# Patient Record
Sex: FEMALE | Race: WHITE | NOT HISPANIC OR LATINO | Employment: FULL TIME | URBAN - METROPOLITAN AREA
[De-identification: names, ages, dates, MRNs, and addresses within clinical notes are randomized per-mention and may not be internally consistent; named-entity substitution may affect disease eponyms.]

---

## 2022-09-15 ENCOUNTER — LAB REQUISITION (OUTPATIENT)
Dept: LAB | Facility: CLINIC | Age: 32
End: 2022-09-15

## 2022-09-15 PROCEDURE — 86481 TB AG RESPONSE T-CELL SUSP: CPT | Performed by: INTERNAL MEDICINE

## 2022-09-17 LAB
QUANTIFERON MITOGEN: 10 IU/ML
QUANTIFERON NIL TUBE: 0.11 IU/ML
QUANTIFERON TB1 TUBE: 0.15 IU/ML
QUANTIFERON TB2 TUBE: 0.15

## 2022-09-18 LAB
GAMMA INTERFERON BACKGROUND BLD IA-ACNC: 0.11 IU/ML
M TB IFN-G BLD-IMP: NEGATIVE
M TB IFN-G CD4+ BCKGRND COR BLD-ACNC: 9.89 IU/ML
MITOGEN IGNF BCKGRD COR BLD-ACNC: 0.04 IU/ML
MITOGEN IGNF BCKGRD COR BLD-ACNC: 0.04 IU/ML

## 2023-02-12 ENCOUNTER — HEALTH MAINTENANCE LETTER (OUTPATIENT)
Age: 33
End: 2023-02-12

## 2023-02-28 ENCOUNTER — PRENATAL OFFICE VISIT (OUTPATIENT)
Dept: NURSING | Facility: CLINIC | Age: 33
End: 2023-02-28
Payer: COMMERCIAL

## 2023-02-28 VITALS — HEIGHT: 68 IN | WEIGHT: 174 LBS | BODY MASS INDEX: 26.37 KG/M2

## 2023-02-28 DIAGNOSIS — Z13.79 GENETIC SCREENING: ICD-10-CM

## 2023-02-28 DIAGNOSIS — F41.9 ANXIETY: Primary | ICD-10-CM

## 2023-02-28 DIAGNOSIS — O36.80X0 PREGNANCY WITH INCONCLUSIVE FETAL VIABILITY: ICD-10-CM

## 2023-02-28 DIAGNOSIS — Z34.01 ENCOUNTER FOR SUPERVISION OF NORMAL FIRST PREGNANCY IN FIRST TRIMESTER: ICD-10-CM

## 2023-02-28 PROBLEM — Z34.00 SUPERVISION OF NORMAL FIRST PREGNANCY: Status: ACTIVE | Noted: 2023-02-28

## 2023-02-28 PROCEDURE — 99207 PR NO CHARGE NURSE ONLY: CPT

## 2023-02-28 RX ORDER — FAMOTIDINE 20 MG
1000 TABLET ORAL
COMMUNITY

## 2023-02-28 RX ORDER — FEXOFENADINE HCL 180 MG/1
180 TABLET ORAL DAILY
COMMUNITY
End: 2023-10-26

## 2023-02-28 RX ORDER — CHLORAL HYDRATE 500 MG
2 CAPSULE ORAL DAILY
COMMUNITY
End: 2023-10-26

## 2023-02-28 NOTE — PROGRESS NOTES
Abhinav has herpres last serology was neg  Maybe pre testing   Abhinav has a 4 year old has had febrile seizures known arachnoid cyst  Sees neuro no active dx of epilepsy                      SUBJECTIVE:     HPI:    This is a 32 year old female patient,  who presents for her first obstetrical visit.    JUDY: 10/9/2023, by Last Menstrual Period.  She is 8w1d weeks.  Her cycles are regular.  Her last menstrual period was normal.   Since her LMP, she has experienced  nausea and fatigue.    Additional History: First pregnancy for Meredith, Abhinav has a 3yo son. 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 He is not on daily suppression.  Discussed if Meredith is positive prophylactic treatment at 36 wks.  Abhinav's son has a known Arachnoid cyst and has had febrile seizures, no diagnosis of epilepsy at this time. His son's birth mother has Marfan syndrome.     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      She has a history of  first pregnancy    Since her last LMP she denies use of alcohol, tobacco and street drugs.    Past medical, surgical, social and family history were reviewed and updated in [facility].      Current Outpatient Medications   Medication     fexofenadine (ALLEGRA) 180 MG tablet     fish oil-omega-3 fatty acids 1000 MG capsule     Prenat Vit-FePoly-Methylfol-FA (PNV-IRON PO)     vitamin B complex with vitamin C (VITAMIN  B COMPLEX) tablet     Vitamin D, Cholecalciferol, 25 MCG (1000 UT) CAPS     No current facility-administered medications for this visit.       ROS:   12  point review of systems negative other than symptoms noted below or in the HPI.  Constitutional: Fatigue  Gastrointestinal: Nausea    Nurse phone visit completed. Prenatal book and folder (containing standard educational hand-outs and brochures will be given at the next visit to patient. Information in folder reviewed over the phone. Questions answered. Information given on optional screening available to assess chromosomal anomalies. Pt desires NIPS. Pt advised to call the clinic if she has any questions or concerns related to her pregnancy. Prenatal labs future ordered.   Dulce Maria Johnson RN on 2/28/2023 at 3:09 PM        No results found for: PAP  PHQ-9 score:    PHQ 2/24/2023   PHQ-9 Total Score 4   Q9: Thoughts of better off dead/self-harm past 2 weeks Not at all               NARGIS-7 SCORE 2/24/2023   Total Score 3 (minimal anxiety)   Total Score 3             Patient supplied answers from flow sheet for:  Prenatal OB Questionnaire.  Past Medical History  Have you ever recieved care for your mental health? : (!) Yes  Have you ever been in a major accident or suffered serious trauma?: No  Within the last year, has anyone hit, slapped, kicked or otherwise hurt you?: No  In the last year, has anyone forced you to have sex when you didn't want to?: No    Past Medical History 2   Have you ever received a blood transfusion?: No  Would you accept a blood transfusion if was medically recommended?: Yes  Does anyone in your home smoke?: No   Is your blood type Rh negative?: (!) Yes  Have you ever ?: No  Have you been hospitalized for a nonsurgical reason excluding normal delivery?: No  Have you ever had an abnormal pap smear?: No    Past Medical History (Continued)  Do you have a history of abnormalities of the uterus?: No  Did your mother take CLAYTON or any other hormones when she was pregnant with you?: No  Do you have any other problems we have not asked about which you feel may be important to this pregnancy?:  "No                   OBJECTIVE:     EXAM:  Ht 1.727 m (5' 8\")   Wt 78.9 kg (174 lb)   LMP 01/02/2023   BMI 26.46 kg/m   Body mass index is 26.46 kg/m .      "

## 2023-03-06 ASSESSMENT — ANXIETY QUESTIONNAIRES
8. IF YOU CHECKED OFF ANY PROBLEMS, HOW DIFFICULT HAVE THESE MADE IT FOR YOU TO DO YOUR WORK, TAKE CARE OF THINGS AT HOME, OR GET ALONG WITH OTHER PEOPLE?: NOT DIFFICULT AT ALL
1. FEELING NERVOUS, ANXIOUS, OR ON EDGE: NOT AT ALL
IF YOU CHECKED OFF ANY PROBLEMS ON THIS QUESTIONNAIRE, HOW DIFFICULT HAVE THESE PROBLEMS MADE IT FOR YOU TO DO YOUR WORK, TAKE CARE OF THINGS AT HOME, OR GET ALONG WITH OTHER PEOPLE: NOT DIFFICULT AT ALL
GAD7 TOTAL SCORE: 3
2. NOT BEING ABLE TO STOP OR CONTROL WORRYING: NOT AT ALL
6. BECOMING EASILY ANNOYED OR IRRITABLE: MORE THAN HALF THE DAYS
GAD7 TOTAL SCORE: 3
GAD7 TOTAL SCORE: 3
7. FEELING AFRAID AS IF SOMETHING AWFUL MIGHT HAPPEN: NOT AT ALL
7. FEELING AFRAID AS IF SOMETHING AWFUL MIGHT HAPPEN: NOT AT ALL
3. WORRYING TOO MUCH ABOUT DIFFERENT THINGS: NOT AT ALL
5. BEING SO RESTLESS THAT IT IS HARD TO SIT STILL: NOT AT ALL
4. TROUBLE RELAXING: SEVERAL DAYS

## 2023-03-06 ASSESSMENT — PATIENT HEALTH QUESTIONNAIRE - PHQ9
10. IF YOU CHECKED OFF ANY PROBLEMS, HOW DIFFICULT HAVE THESE PROBLEMS MADE IT FOR YOU TO DO YOUR WORK, TAKE CARE OF THINGS AT HOME, OR GET ALONG WITH OTHER PEOPLE: NOT DIFFICULT AT ALL
SUM OF ALL RESPONSES TO PHQ QUESTIONS 1-9: 5
SUM OF ALL RESPONSES TO PHQ QUESTIONS 1-9: 5

## 2023-03-12 LAB
ABO/RH(D): NORMAL
ANTIBODY SCREEN: NEGATIVE
SPECIMEN EXPIRATION DATE: NORMAL

## 2023-03-13 ENCOUNTER — PRENATAL OFFICE VISIT (OUTPATIENT)
Dept: OBGYN | Facility: CLINIC | Age: 33
End: 2023-03-13
Payer: COMMERCIAL

## 2023-03-13 ENCOUNTER — ANCILLARY PROCEDURE (OUTPATIENT)
Dept: ULTRASOUND IMAGING | Facility: CLINIC | Age: 33
End: 2023-03-13
Payer: COMMERCIAL

## 2023-03-13 VITALS — DIASTOLIC BLOOD PRESSURE: 60 MMHG | BODY MASS INDEX: 27.06 KG/M2 | WEIGHT: 178 LBS | SYSTOLIC BLOOD PRESSURE: 92 MMHG

## 2023-03-13 DIAGNOSIS — O36.80X0 PREGNANCY WITH INCONCLUSIVE FETAL VIABILITY: ICD-10-CM

## 2023-03-13 DIAGNOSIS — Z34.01 ENCOUNTER FOR SUPERVISION OF NORMAL FIRST PREGNANCY IN FIRST TRIMESTER: Primary | ICD-10-CM

## 2023-03-13 DIAGNOSIS — Z13.79 GENETIC SCREENING: ICD-10-CM

## 2023-03-13 LAB
ALBUMIN UR-MCNC: NEGATIVE MG/DL
APPEARANCE UR: CLEAR
BILIRUB UR QL STRIP: NEGATIVE
COLOR UR AUTO: YELLOW
ERYTHROCYTE [DISTWIDTH] IN BLOOD BY AUTOMATED COUNT: 13.2 % (ref 10–15)
GLUCOSE UR STRIP-MCNC: NEGATIVE MG/DL
HCT VFR BLD AUTO: 34.4 % (ref 35–47)
HGB BLD-MCNC: 11.5 G/DL (ref 11.7–15.7)
HGB UR QL STRIP: NEGATIVE
KETONES UR STRIP-MCNC: NEGATIVE MG/DL
LEUKOCYTE ESTERASE UR QL STRIP: NEGATIVE
MCH RBC QN AUTO: 30.7 PG (ref 26.5–33)
MCHC RBC AUTO-ENTMCNC: 33.4 G/DL (ref 31.5–36.5)
MCV RBC AUTO: 92 FL (ref 78–100)
NITRATE UR QL: NEGATIVE
PH UR STRIP: 6 [PH] (ref 5–7)
PLATELET # BLD AUTO: 320 10E3/UL (ref 150–450)
RBC # BLD AUTO: 3.75 10E6/UL (ref 3.8–5.2)
SP GR UR STRIP: 1.02 (ref 1–1.03)
UROBILINOGEN UR STRIP-ACNC: 0.2 E.U./DL
WBC # BLD AUTO: 7.8 10E3/UL (ref 4–11)

## 2023-03-13 PROCEDURE — 86762 RUBELLA ANTIBODY: CPT | Performed by: STUDENT IN AN ORGANIZED HEALTH CARE EDUCATION/TRAINING PROGRAM

## 2023-03-13 PROCEDURE — 86901 BLOOD TYPING SEROLOGIC RH(D): CPT | Performed by: STUDENT IN AN ORGANIZED HEALTH CARE EDUCATION/TRAINING PROGRAM

## 2023-03-13 PROCEDURE — 87086 URINE CULTURE/COLONY COUNT: CPT | Performed by: STUDENT IN AN ORGANIZED HEALTH CARE EDUCATION/TRAINING PROGRAM

## 2023-03-13 PROCEDURE — 36415 COLL VENOUS BLD VENIPUNCTURE: CPT | Performed by: STUDENT IN AN ORGANIZED HEALTH CARE EDUCATION/TRAINING PROGRAM

## 2023-03-13 PROCEDURE — 99207 PR FIRST OB VISIT: CPT | Performed by: STUDENT IN AN ORGANIZED HEALTH CARE EDUCATION/TRAINING PROGRAM

## 2023-03-13 PROCEDURE — 87389 HIV-1 AG W/HIV-1&-2 AB AG IA: CPT | Performed by: STUDENT IN AN ORGANIZED HEALTH CARE EDUCATION/TRAINING PROGRAM

## 2023-03-13 PROCEDURE — 86780 TREPONEMA PALLIDUM: CPT | Performed by: STUDENT IN AN ORGANIZED HEALTH CARE EDUCATION/TRAINING PROGRAM

## 2023-03-13 PROCEDURE — 85027 COMPLETE CBC AUTOMATED: CPT | Performed by: STUDENT IN AN ORGANIZED HEALTH CARE EDUCATION/TRAINING PROGRAM

## 2023-03-13 PROCEDURE — 86803 HEPATITIS C AB TEST: CPT | Performed by: STUDENT IN AN ORGANIZED HEALTH CARE EDUCATION/TRAINING PROGRAM

## 2023-03-13 PROCEDURE — 81003 URINALYSIS AUTO W/O SCOPE: CPT | Performed by: STUDENT IN AN ORGANIZED HEALTH CARE EDUCATION/TRAINING PROGRAM

## 2023-03-13 PROCEDURE — 86850 RBC ANTIBODY SCREEN: CPT | Performed by: STUDENT IN AN ORGANIZED HEALTH CARE EDUCATION/TRAINING PROGRAM

## 2023-03-13 PROCEDURE — 76817 TRANSVAGINAL US OBSTETRIC: CPT | Performed by: STUDENT IN AN ORGANIZED HEALTH CARE EDUCATION/TRAINING PROGRAM

## 2023-03-13 PROCEDURE — 87340 HEPATITIS B SURFACE AG IA: CPT | Performed by: STUDENT IN AN ORGANIZED HEALTH CARE EDUCATION/TRAINING PROGRAM

## 2023-03-13 PROCEDURE — 86900 BLOOD TYPING SEROLOGIC ABO: CPT | Performed by: STUDENT IN AN ORGANIZED HEALTH CARE EDUCATION/TRAINING PROGRAM

## 2023-03-13 NOTE — PROGRESS NOTES
[facility]  OB/GYN Clinic Note    SUBJECTIVE:      HPI:   This is a 32 year old female patient,  who presents for her first obstetrical visit.  JUDY: 10/9/2023, by Last Menstrual Period.  She is 10w0d.  Her cycles are regular.  Her last menstrual period was normal.   Since her LMP, she has experienced  nausea and fatigue.     Additional History:   -First pregnancy for Meredith  -Abhinav has a 3yo son. 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 He is not on daily suppression. Discussed if Meredith is positive prophylactic treatment at 36 wks.  -Abhinav's son has a known Arachnoid cyst and has had febrile seizures, no diagnosis of epilepsy at this time. His son's birth mother has Marfan syndrome.   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       She has a history of  first pregnancy     Since her last LMP she denies use of alcohol, tobacco and street drugs.     Past medical, surgical, social and family history were reviewed and updated in [facility].            Current Outpatient Medications   Medication     fexofenadine (ALLEGRA) 180 MG tablet     fish oil-omega-3 fatty acids 1000 MG capsule     Prenat Vit-FePoly-Methylfol-FA (PNV-IRON PO)     vitamin B complex with vitamin C (VITAMIN  B COMPLEX) tablet     Vitamin D, Cholecalciferol, 25 MCG (1000 UT) CAPS      No current facility-administered medications for this visit.         ROS:   12 point review of systems negative other than symptoms noted below or in the  HPI.  Constitutional: Fatigue  Gastrointestinal: Nausea     Nurse phone visit completed. Prenatal book and folder (containing standard educational hand-outs and brochures will be given at the next visit to patient. Information in folder reviewed over the phone. Questions answered. Information given on optional screening available to assess chromosomal anomalies. Pt desires NIPS. Pt advised to call the clinic if she has any questions or concerns related to her pregnancy. Prenatal labs future ordered.   Dulce Maria Johnson RN on 2/28/2023 at 3:09 PM           No results found for: PAP  PHQ-9 score:    PHQ 2/24/2023   PHQ-9 Total Score 4   Q9: Thoughts of better off dead/self-harm past 2 weeks Not at all          NARGIS-7 SCORE 2/24/2023   Total Score 3 (minimal anxiety)   Total Score 3        Answers for HPI/ROS submitted by the patient on 3/6/2023  If you checked off any problems, how difficult have these problems made it for you to do your work, take care of things at home, or get along with other people?: Not difficult at all  PHQ9 TOTAL SCORE: 5  NARGIS 7 TOTAL SCORE: 3     Patient supplied answers from flow sheet for:  Prenatal OB Questionnaire.  Past Medical History  Have you ever recieved care for your mental health? : (!) Yes  Have you ever been in a major accident or suffered serious trauma?: No  Within the last year, has anyone hit, slapped, kicked or otherwise hurt you?: No  In the last year, has anyone forced you to have sex when you didn't want to?: No     Past Medical History 2   Have you ever received a blood transfusion?: No  Would you accept a blood transfusion if was medically recommended?: Yes  Does anyone in your home smoke?: No   Is your blood type Rh negative?: (!) Yes  Have you ever ?: No  Have you been hospitalized for a nonsurgical reason excluding normal delivery?: No  Have you ever had an abnormal pap smear?: No     Past Medical History (Continued)  Do you have a history of abnormalities of the  uterus?: No  Did your mother take CLAYTON or any other hormones when she was pregnant with you?: No  Do you have any other problems we have not asked about which you feel may be important to this pregnancy?: No        OBJECTIVE:     EXAM:  BP 92/60   Wt 80.7 kg (178 lb)   LMP 2023   BMI 27.06 kg/m   Body mass index is 27.06 kg/m .    GENERAL: healthy, alert and no distress  HENT: ear canals and TM's normal, nose and mouth without ulcers or lesions  NECK: no adenopathy, no asymmetry, masses, or scars and thyroid normal to palpation  RESP: lungs clear to auscultation - no rales, rhonchi or wheezes  CV: regular rate and rhythm, normal S1 S2, no S3 or S4, no murmur, click or rub, no peripheral edema and peripheral pulses strong  MS: no gross musculoskeletal defects noted, no edema  SKIN: no suspicious lesions or rashes  NEURO: Normal strength and tone, mentation intact and speech normal  PSYCH: mentation appears normal, affect normal/bright    ASSESSMENT/PLAN:   32 year old  at 10w0d by LMP c/w 10w0d US here today for initial prenatal visit. Reports doing well overall.       ICD-10-CM    1. Encounter for supervision of normal first pregnancy in first trimester  Z34.01 Urine Culture Aerobic Bacterial     *UA reflex to Microscopic     ABO/Rh type and screen     Hepatitis B surface antigen     CBC with platelets     HIV Antigen Antibody Combo     Rubella Antibody IgG Quantitative     Treponema Abs w Reflex to RPR and Titer     Hepatitis C antibody      2. Genetic screening  Z13.79 Non Invasive Prenatal Test Cell Free DNA            1. Normal first ob visit.          Labs: UA/UCx, CBC, Type and Screen, RPR, Hep B, Hep C, Rubella, HIV, NIPT        Prenatal testing: reviewed options for genetic screening, patient desires NIPT and DOES want to know fetal sex.         Education: discussed return precautions, including cramping, vaginal bleeding.  Discussed schedule of prenatal visits through 28w, delivery at [facility] and on-call system for deliveries.       Discussed partners HSV status. Would provide tx to Meredith if signs of active outbreak.        Weight gain        Prepregnancy BMI: Body mass index is 27.06 kg/m .        Expected weight gain:    - Recommended weight gain for pregnancy:    BMI   < 18.5   28-40 lbs   18.5 - 24.9 25-35   25 - 29.9 15-25   > 30  11-20     RTC in 4-6weeks for routine care.     Kell Arnett MD, [facility]  03/13/23

## 2023-03-14 LAB
HBV SURFACE AG SERPL QL IA: NONREACTIVE
HCV AB SERPL QL IA: NONREACTIVE
HIV 1+2 AB+HIV1 P24 AG SERPL QL IA: NONREACTIVE
RUBV IGG SERPL QL IA: 0.6 INDEX
RUBV IGG SERPL QL IA: NORMAL
T PALLIDUM AB SER QL: NONREACTIVE

## 2023-03-15 LAB — BACTERIA UR CULT: NO GROWTH

## 2023-03-20 LAB — SCANNED LAB RESULT: NORMAL

## 2023-04-05 ENCOUNTER — OFFICE VISIT (OUTPATIENT)
Dept: URGENT CARE | Facility: URGENT CARE | Age: 33
End: 2023-04-05
Payer: COMMERCIAL

## 2023-04-05 DIAGNOSIS — R07.0 THROAT PAIN: Primary | ICD-10-CM

## 2023-04-05 LAB — DEPRECATED S PYO AG THROAT QL EIA: NEGATIVE

## 2023-04-05 PROCEDURE — 87651 STREP A DNA AMP PROBE: CPT | Performed by: NURSE PRACTITIONER

## 2023-04-05 PROCEDURE — 99207 PR NO CHARGE LOS: CPT | Performed by: NURSE PRACTITIONER

## 2023-04-06 LAB — GROUP A STREP BY PCR: NOT DETECTED

## 2023-04-10 ENCOUNTER — PRENATAL OFFICE VISIT (OUTPATIENT)
Dept: OBGYN | Facility: CLINIC | Age: 33
End: 2023-04-10
Payer: COMMERCIAL

## 2023-04-10 VITALS — BODY MASS INDEX: 27.37 KG/M2 | WEIGHT: 180 LBS | SYSTOLIC BLOOD PRESSURE: 100 MMHG | DIASTOLIC BLOOD PRESSURE: 66 MMHG

## 2023-04-10 DIAGNOSIS — Z34.02 ENCOUNTER FOR SUPERVISION OF NORMAL FIRST PREGNANCY IN SECOND TRIMESTER: Primary | ICD-10-CM

## 2023-04-10 PROCEDURE — 99207 PR PRENATAL VISIT: CPT | Performed by: STUDENT IN AN ORGANIZED HEALTH CARE EDUCATION/TRAINING PROGRAM

## 2023-04-10 NOTE — PROGRESS NOTES
Prenatal Care    Meredith Dean is a 32 year old  at 14w0d by LMP c/w 10w0d US presenting for routine prenatal care. She is doing well. Denies LOF, VB, ctx. +FM.    Conditions affecting pregnancy:  - Rh neg  - Rubella NI      Objective- see flow sheet    Meredith Dean is a 32 year old  at 14w0d presenting for routine ob visit.       ICD-10-CM    1. Encounter for supervision of normal first pregnancy in second trimester  Z34.02 NEISSERIA GONORRHOEA PCR     CHLAMYDIA TRACHOMATIS PCR     Alpha fetoprotein maternal screen     US OB > 14 Weeks        - First tri labs: rh neg, rubella NI. AFP, GC/CT, Anatomy US ordered. S/p COVID-19 vaccine, influenza.   - Discussed routine precautions  - TWlbs. Pregravid BMI 27. Expect 15-25lbs.    Kell Arnett MD, S  04/10/23

## 2023-04-18 ENCOUNTER — LAB (OUTPATIENT)
Dept: LAB | Facility: CLINIC | Age: 33
End: 2023-04-18
Payer: COMMERCIAL

## 2023-04-18 DIAGNOSIS — Z34.02 ENCOUNTER FOR SUPERVISION OF NORMAL FIRST PREGNANCY IN SECOND TRIMESTER: ICD-10-CM

## 2023-04-18 PROCEDURE — 87491 CHLMYD TRACH DNA AMP PROBE: CPT

## 2023-04-18 PROCEDURE — 87591 N.GONORRHOEAE DNA AMP PROB: CPT

## 2023-04-18 PROCEDURE — 82105 ALPHA-FETOPROTEIN SERUM: CPT | Mod: 90

## 2023-04-18 PROCEDURE — 36415 COLL VENOUS BLD VENIPUNCTURE: CPT

## 2023-04-18 PROCEDURE — 99000 SPECIMEN HANDLING OFFICE-LAB: CPT

## 2023-04-19 LAB
C TRACH DNA SPEC QL NAA+PROBE: NEGATIVE
N GONORRHOEA DNA SPEC QL NAA+PROBE: NEGATIVE

## 2023-04-20 LAB
# FETUSES US: NORMAL
AFP MOM SERPL: 0.64
AFP SERPL-MCNC: 17 NG/ML
AGE - REPORTED: 34.8 YR
CURRENT SMOKER: NO
FAMILY MEMBER DISEASES HX: NO
GA METHOD: NORMAL
GA: NORMAL WK
IDDM PATIENT QL: NO
INTEGRATED SCN PATIENT-IMP: NORMAL
SPECIMEN DRAWN SERPL: NORMAL

## 2023-05-19 ENCOUNTER — PRENATAL OFFICE VISIT (OUTPATIENT)
Dept: OBGYN | Facility: CLINIC | Age: 33
End: 2023-05-19
Attending: STUDENT IN AN ORGANIZED HEALTH CARE EDUCATION/TRAINING PROGRAM
Payer: COMMERCIAL

## 2023-05-19 ENCOUNTER — ANCILLARY PROCEDURE (OUTPATIENT)
Dept: ULTRASOUND IMAGING | Facility: CLINIC | Age: 33
End: 2023-05-19
Attending: STUDENT IN AN ORGANIZED HEALTH CARE EDUCATION/TRAINING PROGRAM
Payer: COMMERCIAL

## 2023-05-19 VITALS — WEIGHT: 184.6 LBS | SYSTOLIC BLOOD PRESSURE: 115 MMHG | DIASTOLIC BLOOD PRESSURE: 60 MMHG | BODY MASS INDEX: 28.07 KG/M2

## 2023-05-19 DIAGNOSIS — Z34.02 ENCOUNTER FOR SUPERVISION OF NORMAL FIRST PREGNANCY IN SECOND TRIMESTER: Primary | ICD-10-CM

## 2023-05-19 DIAGNOSIS — Z3A.19 19 WEEKS GESTATION OF PREGNANCY: ICD-10-CM

## 2023-05-19 DIAGNOSIS — O99.019 ANEMIA AFFECTING FIRST PREGNANCY: ICD-10-CM

## 2023-05-19 DIAGNOSIS — Z34.02 ENCOUNTER FOR SUPERVISION OF NORMAL FIRST PREGNANCY IN SECOND TRIMESTER: ICD-10-CM

## 2023-05-19 LAB
BASOPHILS # BLD AUTO: 0 10E3/UL (ref 0–0.2)
BASOPHILS NFR BLD AUTO: 0 %
EOSINOPHIL # BLD AUTO: 0.1 10E3/UL (ref 0–0.7)
EOSINOPHIL NFR BLD AUTO: 1 %
ERYTHROCYTE [DISTWIDTH] IN BLOOD BY AUTOMATED COUNT: 13.6 % (ref 10–15)
HCT VFR BLD AUTO: 32.2 % (ref 35–47)
HGB BLD-MCNC: 10.7 G/DL (ref 11.7–15.7)
IMM GRANULOCYTES # BLD: 0 10E3/UL
IMM GRANULOCYTES NFR BLD: 0 %
LYMPHOCYTES # BLD AUTO: 2.4 10E3/UL (ref 0.8–5.3)
LYMPHOCYTES NFR BLD AUTO: 16 %
MCH RBC QN AUTO: 31.5 PG (ref 26.5–33)
MCHC RBC AUTO-ENTMCNC: 33.2 G/DL (ref 31.5–36.5)
MCV RBC AUTO: 95 FL (ref 78–100)
MONOCYTES # BLD AUTO: 0.8 10E3/UL (ref 0–1.3)
MONOCYTES NFR BLD AUTO: 6 %
NEUTROPHILS # BLD AUTO: 11.3 10E3/UL (ref 1.6–8.3)
NEUTROPHILS NFR BLD AUTO: 77 %
PLATELET # BLD AUTO: 340 10E3/UL (ref 150–450)
RBC # BLD AUTO: 3.4 10E6/UL (ref 3.8–5.2)
WBC # BLD AUTO: 14.7 10E3/UL (ref 4–11)

## 2023-05-19 PROCEDURE — 76805 OB US >/= 14 WKS SNGL FETUS: CPT | Performed by: OBSTETRICS & GYNECOLOGY

## 2023-05-19 PROCEDURE — 85025 COMPLETE CBC W/AUTO DIFF WBC: CPT | Performed by: OBSTETRICS & GYNECOLOGY

## 2023-05-19 PROCEDURE — 99207 PR PRENATAL VISIT: CPT | Performed by: OBSTETRICS & GYNECOLOGY

## 2023-05-19 PROCEDURE — 36415 COLL VENOUS BLD VENIPUNCTURE: CPT | Performed by: OBSTETRICS & GYNECOLOGY

## 2023-05-19 NOTE — PROGRESS NOTES
Prenatal Care (19w4d)    Meredith Dean is a 32 year old  at 19w4d by LMP c/w 10w0d US presenting for routine prenatal care. She is doing well. Denies LOF, VB, ctx. +FM.  She has some questions regarding swelling at work.  Additionally had a string of migraines recently.  Likely due to stresses and low sleep. Wondering about options to help.    Conditions affecting pregnancy:  - Rh neg  - Rubella NI      Objective- see flow sheet    Meredith Dean is a 32 year old  at 19w4d presenting for routine ob visit.       ICD-10-CM    1. Encounter for supervision of normal first pregnancy in second trimester  Z34.02       2. Anemia affecting first pregnancy  O99.019 CBC with platelets and differential     CBC with platelets and differential      3. 19 weeks gestation of pregnancy  Z3A.19         - First tri labs: rh neg, rubella NI. AFP, GC/CT, Anatomy US: WNL. S/p COVID-19 vaccine, influenza.   - Discussed routine precautions  - Discussed compression socks, hydration, and epsom salt soaks to help with swelling.  Reviewed when we would be concerned that swelling may be related to pregnancy complications such as pre-eclampsia  - Discussed migraine treatment in pregnancy.    - Anemia: patient has been taking oral iron.  Will recheck today.  - TWlbs. Pregravid BMI 27. Expect 15-25lbs.  - RTC in 4 weeks for routine OB care.    Gisella Damon MD on 2023

## 2023-06-05 ENCOUNTER — PRENATAL OFFICE VISIT (OUTPATIENT)
Dept: OBGYN | Facility: CLINIC | Age: 33
End: 2023-06-05
Payer: COMMERCIAL

## 2023-06-05 VITALS — WEIGHT: 186 LBS | SYSTOLIC BLOOD PRESSURE: 104 MMHG | DIASTOLIC BLOOD PRESSURE: 52 MMHG | BODY MASS INDEX: 28.28 KG/M2

## 2023-06-05 DIAGNOSIS — Z34.02 ENCOUNTER FOR SUPERVISION OF NORMAL FIRST PREGNANCY IN SECOND TRIMESTER: Primary | ICD-10-CM

## 2023-06-05 PROCEDURE — 99207 PR PRENATAL VISIT: CPT | Performed by: STUDENT IN AN ORGANIZED HEALTH CARE EDUCATION/TRAINING PROGRAM

## 2023-06-05 NOTE — PROGRESS NOTES
Prenatal Care    Meredith Dean is a 32 year old  at 22w0d by LMP c/w 10w0d US presenting for routine prenatal care. She is doing well. Considering transfer to [facility] for water birth.  Denies LOF, VB, ctx. +FM.     Conditions affecting pregnancy:  - Rh neg  - Rubella NI    Objective- see flow sheet    Meredith Dean is a 32 year old  at 22w0d presenting for routine ob visit.       ICD-10-CM    1. Encounter for supervision of normal first pregnancy in second trimester  Z34.02         - First tri labs: rh neg, rubella NI. AFP, GC/CT, Anatomy US: WNL. Hgb 11.5>10.7. on PO Fe- ferrous fumarate. S/p COVID-19 vaccine, influenza. Third tri labs, TDAP, Rhogam at follow-up visit.  - Discussed routine precautions. Would like to at least have consultation with CNM regarding water birth. Reviewed that MDs do not offer this service. Pending the consultation, she may transfer care to [facility]. Reviewed pregnancy leave duration, instructed to follow-up with HR for paperwork. Reviewed indications for growth US.   - TWlbs. Pregravid BMI 27. Expect 15-25lbs.    RTC in 4 weeks for routine care, consult with CNM.    Kell Arnett MD, S  23

## 2023-06-05 NOTE — PATIENT INSTRUCTIONS
Understanding Round Ligament Pain in Pregnancy   Round ligament pain is a common problem in pregnancy. Ligaments are strong tissues that connect bones, muscles, and organs. There are 2 round ligaments. There is 1 on each side of the uterus. The top part of each ligament attaches to the upper side of the uterus. The bottom of each ligament attaches down in the pubic area. These ligaments help keep the uterus in place as you move around.     What causes round ligament pain in pregnancy?   As your uterus grows during pregnancy, the round ligaments are stretched and work harder when you move around. They may stretch too quickly when you stand up or bend or laugh. Nearby nerves may be irritated, or the ligaments may have a painful spasm.   Symptoms of ligament pain in pregnancy  The symptoms are sharp pains that last a few seconds. The pain may happen most often on the right side of the belly. It may happen in the hip, the lower belly, or even deep down in your pubic area. The pain may happen when you:     Move suddenly    Stand up    Walk    Roll over in bed    Laugh    Cough    Sneeze  Diagnosing round ligament pain in pregnancy   Your healthcare provider will ask about your symptoms and give you a physical exam. He or she may give you tests to check for other problems that can cause pain, such as an ovarian cyst or enlarged vein (varicocele). He or she will also check for signs of  labor or other pregnancy problems.   Treatment for round ligament pain in pregnancy   To help prevent pain:    Move slowly when you stand up, roll over, turn, or bend.    Don t stand for long periods of time.    Don t lift heavy objects.    Do gentle daily stretches of your hip joints.  When to call your healthcare provider  Call your healthcare provider right away if you have any of these:     Fever of 100.4 F (38 C) or higher    Pains that last more than a few minutes    Pain that gets worse    Bleeding, nausea, vomiting, or  other new symptoms  [employer] last reviewed this educational content on 3/1/2020    9576-6707 [employer]. All rights reserved. This information is not intended as a substitute for professional medical care. Always follow your healthcare professional's instructions.          Relieving Back Pain During Pregnancy: Wall Stretch, Body Bend   Before trying these exercises, talk to your healthcare provider to make sure they are safe for you. Ask your healthcare provider how many times to do each exercise.   Wall stretch  This strengthens and loosens the muscles in your upper back:   1. Lean against a wall with a firm pillow or rolled towel under your shoulder blades. Your feet should be about 12 inches from the wall and shoulder-width apart. Point your chin down.  2. Breathe in. Push your shoulders, neck, and head against the wall. You will feel a stretch in your shoulders.  3. Hold for 5 counts. Then breathe out, and relax your shoulders and neck.   Body bend  This strengthens your back and buttocks muscles:   1. Stand with your legs shoulder-width apart. Put your hands on your upper thighs and bend your knees slightly.  2. Slowly bend forward at the hips. Push your hips back and keep your shoulders up. Make sure your back is straight. You ll feel a stretch in your upper thighs. You ll also feel your back muscles holding you in position.  3. Hold for 5 counts, then straighten.   [employer] last reviewed this educational content on 7/1/2021 2000-2022 [employer]. All rights reserved. This information is not intended as a substitute for professional medical care. Always follow your healthcare professional's instructions.          Pregnancy: Planning Your Exercise Routine  While you re pregnant, an exercise routine helps both your mind and your body feel good. It tones your muscles and makes them stronger. It also gives you and your baby more oxygen.   The right exercise for you    Overall  conditioning is best for you and your baby. Try walking, swimming, or riding a stationary bike. Always warm up, cool down, and drink enough fluids. Keep a snack close by in case your blood sugar gets low. Discuss exercise choices with your healthcare provider. Talk about the following:     If you already exercise, find out how to adapt your routine while you re pregnant. Keep the intensity of the exercise moderate. As your pregnancy progresses, your center of gravity will change. Be careful to keep your balance.    Ask if there are any local prenatal exercise classes, such as yoga or water aerobics. Find out which prenatal exercise videos are good choices.    If you were not exercising before your pregnancy, find out the best way to start. Now is not the time to begin a new workout on your own. Start slowly. Listen to your body.    Ask which forms of exercise you should avoid. These may include risky activities like hot yoga, horseback riding, scuba diving, skiing, skating, and contact sports.  Pelvic tilts  These help strengthen your stomach muscles and low back. You can do pelvic tilts instead of sit-ups.     Do this exercise on your hands and knees.    Relax the back of your neck. Pull your stomach in until your low back flattens.    Hold for 30 seconds. Release. Repeat 10 times. Do this twice a day.  Kegel exercises  Kegel exercises strengthen the pelvic muscles. Doing Kegels daily helps prepare these muscles for delivery. Kegels also help ease your recovery. You exercise these muscles by tightening, holding, then relaxing them. To do 1 type of Kegel exercise, contract as if you were stopping your urine stream (but do it when you re not urinating). Hold for 10 seconds, then repeat 10 times, a few times a day.   Tips to add activity  Here are some tips to follow:    Park the car farther from a store and walk.    If you can, do errands on foot instead of driving.    Walk across the office to talk to someone in  person instead of calling.    While waiting for appointments, go up and down stairs or around the block.  Tips to stay active  Here are some tips to follow:    Maintain your routine. But exercise less intensely if you feel tired.    Base your workout on how you feel, not your heart rate. Heart rates aren t a good way to measure effort during pregnancy.    Don't exercise on your back after week 16.  What are the warning signs that I should stop exercising?  Stop exercising and call your healthcare provider if you have any of these symptoms:    Vaginal bleeding     Dizziness or feeling faint     Increased shortness of breath     Chest pain     Headache     Muscle weakness     Calf (back of the leg) pain or swelling      Uterine contractions or  labor     Decreased fetal movement     Fluid leaking (or gushing) from your vagina  [employer] last reviewed this educational content on 2021-2022 [employer]. All rights reserved. This information is not intended as a substitute for professional medical care. Always follow your healthcare professional's instructions.          Blood Glucose Screening During Pregnancy  Gestational diabetes is diabetes that only pregnant women get. Changes in your body during pregnancy can cause high blood sugar (glucose). This can cause problems for you and your baby. It is a serious condition. But it can be controlled.     Your healthcare provider will talk with you about blood glucose screening.     Who is at risk for gestational diabetes?  You are at risk of getting gestational diabetes if any of the risk factors below apply to you. The risk for this condition gets higher as your number of risk factors increases:    You are , , , , or .    You weigh more than your healthcare provider says is healthy for you.    You have a relative with diabetes.    You are older than 25.    You had gestational diabetes  during a past pregnancy.    You had a stillbirth or a very large baby before.    You have a history of abnormal glucose tolerance.    You have sugar in your urine at the first prenatal visit.    You have metabolic syndrome, PCOS (polycystic ovary syndrome), are using glucocorticoids, or have high blood pressure.    You are pregnant with twins or more  What happens during a screening?  Here is what to expect during a blood glucose screening:    There is conflicting advice for screening. But the American College of Obstetricians and Gynecologists currently advises that all pregnant women be screened for gestational diabetes. When you are screened depends on your risk. Women are tested at 24 to 28 weeks of pregnancy. Women at high risk may be tested when they first learn they are pregnant.    To do the screening, a blood sample is taken. Your blood sugar level is measured.    If the results show a high blood sugar level, a glucose tolerance test may be ordered. You will drink a certain amount of sugar. This test measures how long it takes for sugar to leave your blood. The test will show if you have gestational diabetes.  What to know if you test positive  Here are some things you need to know:    Gestational diabetes can be treated. The best way to control it is to find out you have it early and start treatment quickly.    This condition can cause problems for the mother during pregnancy. It can also cause problems with the baby during pregnancy, delivery, and after. Treatment greatly lowers the chance for problems.    The changes in your body that cause gestational diabetes normally happen only when you are pregnant. After the baby is born, your body goes back to normal. The condition goes away. But you may be more likely to have type 2 diabetes later. Talk with your healthcare provider about ways to help prevent type 2 diabetes.  Treating gestational diabetes  Here is how to treat gestational diabetes:    You ll  need to check your blood sugar often. You can do this at home. Prick your finger and check a drop of blood on a glucose monitor. Your healthcare provider will show you how and when to check your blood sugar. They will talk about your target blood sugar level.    To manage your blood sugar, you will be given a special plan. It will likely include meal planning and getting regular exercise. Some women need to take a hormone called insulin. Others may take medicine to help control their blood sugar.  [employer] last reviewed this educational content on 8/1/2020 2000-2022 [employer]. All rights reserved. This information is not intended as a substitute for professional medical care. Always follow your healthcare professional's instructions.          Tdap (Tetanus, Diphtheria, Pertussis) Vaccine: What You Need to Know    This is a Vaccine Information Statement from the CDC.   Many vaccine information statements are available in [place]an and other languages. See www.immunize.org/vis   Hojas de información sobre vacunas están disponibles en español y en muchos otros idiomas. Visite www.immunize.org/vis   1. Why get vaccinated?   Tdap vaccine can prevent tetanus, diphtheria, and pertussis.   Diphtheria and pertussis spread from person to person. Tetanus enters the body through cuts or wounds.     TETANUS (T) causes painful stiffening of the muscles. Tetanus can lead to serious health problems, including being unable to open the mouth, having trouble swallowing and breathing, or death.    DIPHTHERIA (D) can lead to difficulty breathing, heart failure, paralysis, or death.    PERTUSSIS (aP), also known as  whooping cough,  can cause uncontrollable, violent coughing that makes it hard to breathe, eat, or drink. Pertussis can be extremely serious especially in babies and young children, causing pneumonia, convulsions, brain damage, or death. In teens and adults, it can cause weight loss, loss of bladder control,  passing out, and rib fractures from severe coughing.  2. Tdap vaccine   Tdap is only for children 7 years and older, adolescents, and adults.   Adolescents should receive a single dose of Tdap, preferably at age 11 or 12 years.   Pregnant people should get a dose of Tdap during every pregnancy, preferably during the early part of the third trimester, to help protect the  from pertussis. Infants are most at risk for severe, life-threatening complications from pertussis.   Adults who have never received Tdap should get a dose of Tdap.   Also, adults should receive a booster dose of either Tdap or Td (a different vaccine that protects against tetanus and diphtheria but not pertussis) every 10 years , or after 5 years in the case of a severe or dirty wound or burn.   Tdap may be given at the same time as other vaccines.   3. Talk with your health care provider  Tell your vaccination provider if the person getting the vaccine:     Has had an allergic reaction after a previous dose of any vaccine that protects against tetanus, diphtheria, or pertussis , or has any severe, life-threatening allergies    Has had a coma, decreased level of consciousness, or prolonged seizures within 7 days after a previous dose of any pertussis vaccine (DTP, DTaP, or Tdap)    Has seizures or another nervous system problem    Has ever had Guillain-Barré Syndrome (also called  GBS )    Has had severe pain or swelling after a previous dose of any vaccine that protects against tetanus or diphtheria  In some cases, your health care provider may decide to postpone Tdap vaccination until a future visit.   People with minor illnesses, such as a cold, may be vaccinated. People who are moderately or severely ill should usually wait until they recover before getting Tdap vaccine.   Your health care provider can give you more information.  4. Risks of a vaccine reaction     Pain, redness, or swelling where the shot was given, mild fever, headache,  feeling tired, and nausea, vomiting, diarrhea, or stomachache sometimes happen after Tdap vaccination.  People sometimes faint after medical procedures, including vaccination. Tell your provider if you feel dizzy or have vision changes or ringing in the ears.   As with any medicine, there is a very remote chance of a vaccine causing a severe allergic reaction, other serious injury, or death.   5. What if there is a serious problem?  An allergic reaction could occur after the vaccinated person leaves the clinic. If you see signs of a severe allergic reaction (hives, swelling of the face and throat, difficulty breathing, a fast heartbeat, dizziness, or weakness), call 9-1-1 and get the person to the nearest hospital.   For other signs that concern you, call your health care provider.   Adverse reactions should be reported to the Vaccine Adverse Event Reporting System (VAERS). Your health care provider will usually file this report, or you can do it yourself. Visit the VAERS website at www.vaers.hhs.gov or call 1-216.841.5680. VAERS is only for reporting reactions, and VAERS staff members do not give medical advice.   6. The National Vaccine Injury Compensation Program  The National Vaccine Injury Compensation Program (VICP) is a federal program that was created to compensate people who may have been injured by certain vaccines. Claims regarding alleged injury or death due to vaccination have a time limit for filing, which may be as short as two years. Visit the VICP website at www.hrsa.gov/vaccinecompensation or call 1-900.125.8789 to learn about the program and about filing a claim.   7. How can I learn more?     Ask your health care provider.    Call your local or state health department.    Visit the website of the Food and Drug Administration (FDA) for vaccine package inserts and additional information at www.fda.gov/vaccines-blood-biologics/vaccines.  ? Contact the Centers for Disease Control and Prevention  (CDC): Call 1-874.314.3809 ( 1-800-CDC-INFO) or  ? Visit CDC s website at www.cdc.gov/vaccines.  Vaccine Information Statement   Tdap (Tetanus, Diphtheria, Pertussis) Vaccine  42 U.S.C.   [id number]  8/6/2021  [employer] last reviewed this educational content on     1659-0752 [employer]. All rights reserved. This information is not intended as a substitute for professional medical care. Always follow your healthcare professional's instructions.

## 2023-06-06 ENCOUNTER — MYC MEDICAL ADVICE (OUTPATIENT)
Dept: OBGYN | Facility: CLINIC | Age: 33
End: 2023-06-06
Payer: COMMERCIAL

## 2023-06-20 DIAGNOSIS — Z29.13 NEED FOR RHOGAM DUE TO RH NEGATIVE MOTHER: Primary | ICD-10-CM

## 2023-06-20 DIAGNOSIS — Z23 NEED FOR TDAP VACCINATION: ICD-10-CM

## 2023-06-20 DIAGNOSIS — Z36.9 ENCOUNTER FOR ANTENATAL SCREENING: ICD-10-CM

## 2023-06-30 ENCOUNTER — MYC MEDICAL ADVICE (OUTPATIENT)
Dept: MIDWIFE SERVICES | Facility: CLINIC | Age: 33
End: 2023-06-30
Payer: COMMERCIAL

## 2023-07-02 NOTE — PROGRESS NOTES
26w0d  Here to discuss possible YANIQUE to CNMs for waterbirth.  Fetal movement: positive   Denies loss of fluid/vb/contractions, signs and symptoms preeclampsia  GCT/CBC today  Need for Rhogam? Yes, O neg- to be given at 28 weeks   Tdap next visit at 28 weeks   Counseled on signs and symptoms of PTL, preeclampsia, discussed fetal movement awareness  Discussed CNM group, philosophy of care and waterbirth at length. Answered questions about L&D and waterbirth. Meredith and Abhinav have a  that will be with them in labor.     Return to clinic in 2 weeks for routine prenatal care with CNM or OB GHULAM Esparza, INGA    *Needs Rhogam and Tdap at next visit (28wks)

## 2023-07-03 ENCOUNTER — PRENATAL OFFICE VISIT (OUTPATIENT)
Dept: MIDWIFE SERVICES | Facility: CLINIC | Age: 33
End: 2023-07-03
Payer: COMMERCIAL

## 2023-07-03 ENCOUNTER — LAB (OUTPATIENT)
Dept: LAB | Facility: CLINIC | Age: 33
End: 2023-07-03
Payer: COMMERCIAL

## 2023-07-03 VITALS — SYSTOLIC BLOOD PRESSURE: 98 MMHG | BODY MASS INDEX: 29.19 KG/M2 | WEIGHT: 192 LBS | DIASTOLIC BLOOD PRESSURE: 58 MMHG

## 2023-07-03 DIAGNOSIS — Z34.02 ENCOUNTER FOR SUPERVISION OF NORMAL FIRST PREGNANCY IN SECOND TRIMESTER: Primary | ICD-10-CM

## 2023-07-03 DIAGNOSIS — Z36.9 ENCOUNTER FOR ANTENATAL SCREENING: ICD-10-CM

## 2023-07-03 PROBLEM — M72.0 DUPUYTREN'S CONTRACTURE OF LEFT HAND: Status: ACTIVE | Noted: 2023-07-03

## 2023-07-03 PROBLEM — F41.9 ANXIETY: Status: RESOLVED | Noted: 2023-02-28 | Resolved: 2023-07-03

## 2023-07-03 PROBLEM — G43.119 INTRACTABLE MIGRAINE WITH AURA WITHOUT STATUS MIGRAINOSUS: Status: ACTIVE | Noted: 2021-10-20

## 2023-07-03 LAB
ANTIBODY SCREEN: NEGATIVE
ERYTHROCYTE [DISTWIDTH] IN BLOOD BY AUTOMATED COUNT: 13.4 % (ref 10–15)
GLUCOSE 1H P 50 G GLC PO SERPL-MCNC: 84 MG/DL (ref 70–129)
HCT VFR BLD AUTO: 34.3 % (ref 35–47)
HGB BLD-MCNC: 11.3 G/DL (ref 11.7–15.7)
MCH RBC QN AUTO: 31.5 PG (ref 26.5–33)
MCHC RBC AUTO-ENTMCNC: 32.9 G/DL (ref 31.5–36.5)
MCV RBC AUTO: 96 FL (ref 78–100)
PLATELET # BLD AUTO: 338 10E3/UL (ref 150–450)
RBC # BLD AUTO: 3.59 10E6/UL (ref 3.8–5.2)
SPECIMEN EXPIRATION DATE: NORMAL
WBC # BLD AUTO: 15.4 10E3/UL (ref 4–11)

## 2023-07-03 PROCEDURE — 36415 COLL VENOUS BLD VENIPUNCTURE: CPT

## 2023-07-03 PROCEDURE — 99207 PR PRENATAL VISIT: CPT | Performed by: ADVANCED PRACTICE MIDWIFE

## 2023-07-03 PROCEDURE — 82950 GLUCOSE TEST: CPT

## 2023-07-03 PROCEDURE — 85027 COMPLETE CBC AUTOMATED: CPT

## 2023-07-03 PROCEDURE — 86850 RBC ANTIBODY SCREEN: CPT

## 2023-07-03 NOTE — PATIENT INSTRUCTIONS
Thank you for coming to see the Midwives at the   [facility]!    Please take prenatal vitamin with DHA and vitamin D3 2,000-3,000 international unit(s) once daily during the entire pregnancy.    We will notify you about your labs that were drawn today once we get the results back.  If you have MyChart your lab results will be posted there.    Someone from the clinic will send you a [employer] message or call you personally with your results.    If you need any refills of medications please call your pharmacy and they will contact us.    If you have a medical emergency please call 911.    If you have any concerns about today's visit, wish to schedule another appointment, or have an urgent medical concern please call our office at [phone]. You can also make appointments through [employer].    After hours you may also call the clinic number above to be connected with [place]'s after hours triage nurse.  The nurse can page the midwife on call if needed. There is always a midwife on call 24 hours a day.    Prenatal Care Recommendations:    Before 14 weeks: Dating ultrasound, genetic testing       This ultrasound helps us determine your dates accurately. Innatal (genetic screening test) can be drawn anytime after 10 weeks of gestation.    16 weeks: Optional genetic testing single AFP       This testing helps understand your baby's risk for some genetic abnormalities.    18-22 weeks:  Screening anatomy ultrasound       This testing will look for early growth abnormalities, placenta location, and may tell the baby's gender if you wish to find out.    24-27 weeks: One hour diabetes test (GCT) and complete blood count       This test helps identify diabetes of pregnancy or gestational diabetes.  We also look   at the iron in your blood and how well your blood clots.    28 - 36 weeks: Tetanus shot (Tdap)       This shot helps protect you and your baby from whooping cough.    36 weeks and later: Group B  Strep test (GBS)       This test helps predict if you need antibiotics in labor to prevent infection for your baby.    Anytime September to April:  Flu shot       This shot helps protect you and your family from the flu.  This is especially important during pregnancy.        The typical schedule after your first visit today you can expect:     Visit 2 - 12-16 weeks  Visit 3 - 20 weeks  Visit 4 - 24 weeks  Visit 5 - 28 weeks  Visit 6 - 30 weeks  Visit 7 - 32 weeks  Visit 8 - 34 weeks  Visit 9 - 36 weeks  Weekly after 36 weeks until delivery.        Any time during or after your pregnancy you may experience increased depression and/or mood changes.    We are here to support you. Please contact us if you are:  Feeling anxious  Overwhelmed or sad   Trouble sleeping  Crying uncontrollably  Trouble caring for yourself or baby.  Any thoughts of hurting yourself, your baby, or anyone else    If anything comes up between your visits or you have concerns please don't hesitate to contact us.    Secure access to your medical record:  Use [employer] (secure email communication and access to your chart) to send your primary care provider a message or make an appointment. Ask someone on your Team how to sign up for [employer]. To log on to [employer] or for more information in [employer] please visit the website at [email or web address]       Certified Nurse Midwife (CNM) Team    INGA Powell, INGA PARMAR, INGA, [facility]  Kennedy Kirby DNP, APRN, INGA Nicole DNP, APRSUNNI, INGA Cedeno DNP, GHULAM, ROBERTAM    Link to Midwifery Care website: [email or web address]      Again, thank you for choosing the midwives at [facility].  We are excited to be a part of your pregnancy! Please let us know how we can best partner with you to improve your and your family's health.

## 2023-07-17 ENCOUNTER — PRENATAL OFFICE VISIT (OUTPATIENT)
Dept: MIDWIFE SERVICES | Facility: CLINIC | Age: 33
End: 2023-07-17
Payer: COMMERCIAL

## 2023-07-17 VITALS
HEIGHT: 68 IN | SYSTOLIC BLOOD PRESSURE: 115 MMHG | WEIGHT: 195.2 LBS | BODY MASS INDEX: 29.58 KG/M2 | DIASTOLIC BLOOD PRESSURE: 64 MMHG

## 2023-07-17 DIAGNOSIS — Z34.03 ENCOUNTER FOR SUPERVISION OF NORMAL FIRST PREGNANCY IN THIRD TRIMESTER: Primary | ICD-10-CM

## 2023-07-17 PROCEDURE — 99207 PR PRENATAL VISIT: CPT | Performed by: ADVANCED PRACTICE MIDWIFE

## 2023-07-17 PROCEDURE — 96372 THER/PROPH/DIAG INJ SC/IM: CPT | Performed by: ADVANCED PRACTICE MIDWIFE

## 2023-07-17 NOTE — PATIENT INSTRUCTIONS
Kick Counts  It s normal to worry about your baby s health. Generally, you will feel your baby start to move in your 2nd trimester at around 16 to 24 weeks. Getting to know the pattern of your baby's movements is one way to know what's normal for you and baby. This is called a kick count. Talk with your healthcare provider about kick counts and your specific situation. Always follow your provider's instructions.   How to count kicks    Here is just one way to do kick counts. Always follow your healthcare provider's instructions. Starting at 28 weeks, count your baby's movements daily. Time how long it takes you to feel 10 kicks, flutters, swishes, or rolls. Ideally, you want to feel at least 10 movements in 2 hours. You will likely feel 10 movements in less time than that.   Here are tips for counting kicks:     Choose a time when the baby is active, such as after a meal.     Sit comfortably or lie on your side.     The first time the baby moves, write down the time.     Count each movement until the baby has moved  10 times. This can take from 20 minutes to 2 hours.     If you haven't felt 10 kicks by the end of the second hour, wait a few hours. Then try again.    Try to do it at the same time each day.  When to call your healthcare provider  Follow your provider's instructions about when to call about your baby's movements. Don't hesitate to call if you have concerns.   Call your healthcare provider  right away if:     You do a couple sets of kick counts during the day and your baby moves fewer than 10 times in 2 hours.    Your baby moves much less often than on the days before.    You haven't felt your baby move all day.  [employer] last reviewed this educational content on 12/1/2022 2000-2023 [employer]. All rights reserved. This information is not intended as a substitute for professional medical care. Always follow your healthcare professional's instructions.        Counting Your Baby's  Movements   Counting your unborn baby's movements will assure you of your baby's health.   The best time to count is when your baby is most active. Do it at the same time every day.  To keep track of your baby's movements, use the attached chart. Bring the chart with you to each clinic visit.  1. Do not smoke for at least 2 hours before counting your baby's movements. (In fact, it's best to quit smoking if you're pregnant.)  2. Lie on your side. Put your hand on your baby.  3. Write the time you start counting movements.  4. Count the next 10 kicks, rolls, and other movements.  5. Write the time when your baby has finished 10 movements.  6. If you reach 10 movements within 2 hours, you're done for the day.  Call your care provider right away if:    You feel no movement for the first hour.    It takes more than 2 hours to feel 10 movements.    There's a change in the normal pattern of movements.  Your care provider's phone number is:   ________________________________________  The number to your [facility] center is:   ________________________________________     For informational purposes only. Not to replace the advice of your health care provider. Copyright   ,  [facility]. All rights reserved. Clinically reviewed by Bridgette Bustos RN, [facility]. [employer] [id number] - REV 10/21.       Understanding  Labor  Going into labor before week 37 of pregnancy is called  labor.  labor can cause your baby to be born too soon. This can lead to health problems for your baby.     Before labor, the cervix is thick and closed.      In  labor, the cervix begins to efface (thin) and dilate (open).     Symptoms of  labor  If you think you re having  labor, get medical help right away. Contractions alone don t mean you re in  labor. What matters more are changes in your cervix. The cervix is the opening at the lower end of the uterus. Symptoms  of  labor include:    4 or more contractions per hour    Strong contractions    Constant menstrual-like cramping    Low-back pain    Mucous or bloody fluid from the vagina    Bleeding or spotting in the second or third trimester  Evaluating  labor  Your healthcare provider will try to find out if you re in  labor or just having contractions. They may watch you for a few hours. You may have these tests:    Pelvic exam. This is to see if your cervix has effaced (thinned) and dilated (opened).    Uterine activity monitoring. This is used to detect contractions.    Fetal monitoring. This is done to check the health of your baby.    Ultrasound. This test looks at your baby s size and position.    Amniocentesis. This test checks how mature your baby s lungs are.  Caring for yourself at home  If you have  contractions, but your cervix is still thick and closed, your healthcare provider may tell you to:    Drink plenty of water.    Do fewer activities.    Rest in bed on your side.    Don't have intercourse or stimulate your nipples.  When to call your healthcare provider  Call your healthcare provider if you have any of these:    4 or more contractions per hour    Bag of water breaks    Bleeding or spotting  If you need hospital care   labor often means that you need hospital care. You may need complete bed rest. You may have an IV (intravenous) line in your arm or hand. This is to give you fluids. You may be given pills or injections. These are done to help prevent contractions. You may get a medicine called a corticosteroid. This is to help your baby s lungs mature more quickly.  Are you at risk?  Any pregnant woman can have  labor. It may start for no reason. But these risk factors can increase your chances:    Past  labor or early birth    Smoking, drug, or alcohol use in pregnancy    A multiple pregnancy (twins or more)    Problems with the shape of the  "uterus    Bleeding during the pregnancy  The dangers of  birth  A baby born too soon may have health problems. This is because the baby didn t have enough time to grow. Some of the risks for your baby include:    Not breastfeeding or feeding well    Having immature lungs    Bleeding in the brain    Death  Reaching term  Your goal is to get as close to term (week 37 or later) as you can before giving birth. The closer you get to term, the higher your chance of having a healthy baby. Work with your healthcare provider. Together, you can take steps that may keep you from giving birth too early.  [employer] last reviewed this educational content on 10/1/2021    9589-1135 [employer]. All rights reserved. This information is not intended as a substitute for professional medical care. Always follow your healthcare professional's instructions.          Using Nitrous Oxide During Labor  What is nitrous oxide?  Nitrous oxide is a mixture of 50% nitrous oxide gas and 50% oxygen that is inhaled through a mask. Nitrous oxide is better known for use in dental offices or before surgery to help patients relax. Most people know of it as \"laughing gas.\"   How do I use it during labor?  You hold your own mask and begin to inhale the gas mixture about 30 seconds before a contraction begins. Breathing before and during a contraction helps the gas work the best right at the peak of the contraction, providing the greatest relief. It may take 3 to 4 contractions to get used to the rhythm of breathing the nitrous oxide.   Does nitrous oxide have any side effects?  Some women have reported dizziness, feeling sleepy, dry mouth and nausea (feeling sick to your stomach) with use of nitrous oxide. These side effects often decrease over time. Medicine is available to help ease nausea if it is a concern for you.   Is any extra monitoring required for me or my baby?  No. Your monitoring will not need to change just because you " are using nitrous oxide.   Can I still be out of bed and use nitrous oxide?  Yes. Women sometimes feel dizzy when using nitrous oxide. For this reason, you will begin using nitrous oxide while in a bed or chair. If you feel okay, you may get up and walk or use a birthing ball or stool. It will be important that you have someone in the room close by for support.   Can I use nitrous oxide and have IV pain medicines at the same time?  No. The combination of narcotics (injectable pain medications) and nitrous oxide can slow your breathing, so it is not safe for them to be used together. You may use nitrous oxide before receiving an epidural or IV pain medication.  If I use nitrous oxide can I still get an epidural?  Yes. You may wish to use nitrous oxide until you are ready for an epidural. Nitrous oxide can be less effective than an epidural in reducing labor pain. If an epidural is part of your birth plan, it is important that you get your epidural while you are still able to sit for safe placement.   Are there reasons I couldn't use nitrous oxide?   Yes. You cannot use nitrous oxide if you:    Cannot hold your own face mask.    Have received a dose of narcotic in the past few hours (your nurse will discuss this with you).    Have a documented B12 deficiency.    Have one of a very few other rare medical conditions.  Can my labor support person help me with my nitrous oxide?  No! Only you can administer nitrous oxide to yourself. Part of the built-in safety of nitrous oxide is that you hold your own mask. If anyone in the room is found to be handling the nitrous oxide equipment other than you or your nurse, the nitrous oxide will be removed.  Can I use nitrous oxide with other procedures?   Yes. If nitrous oxide is right for you and your provider agrees, it may be used in these situations:    During the repair of a laceration or episiotomy    Manual removal of your placenta    Blood draws    IV placement  For  informational purposes only. Not to replace the advice of your health care provider. Copyright   2019 [facility]. All rights reserved. Clinically reviewed by  [employer] Team. [employer] 657368 - Rev .          Pregnancy: Your Third Trimester Changes  As the baby grows, your body changes, too. You may also see signs that your body is getting ready for labor. Be patient. Within a few more weeks, your baby will be born.   How you are changing  Your body is preparing for the birth of your baby. Some of the most common changes are listed below. If you have any questions or concerns, ask your healthcare provider:     You ll gain more weight from fluids, extra blood, and fat deposits.    Your breasts will grow as your body gets ready to feed the baby. They may be more tender. You may also notice a slight yellow or white discharge from the nipples.    Discharge from your vagina may increase. This is normal.    You might see some skin color changes on your forehead, cheeks, or nose. Most of these will go away after you deliver.  How your baby is growing  Month 7  Your baby can open and close their eyes and weighs around 4 pounds (1.8 kg). If born prematurely (too early), your baby would likely survive with special care.     Month 8  Your baby is building up body fat and weighs around 6 pounds (2.7 kg).    Month 9  Your baby weighs nearly 7 pounds (3.2 kg) and is about 19 to 21 inches long. In other words, any day now...     [employer] last reviewed this educational content on 2021-2023 [employer]. All rights reserved. This information is not intended as a substitute for professional medical care. Always follow your healthcare professional's instructions.

## 2023-07-17 NOTE — NURSING NOTE
Prior to immunization administration, verified patients identity using patient s name and date of birth. Please see Immunization Activity for additional information.     Screening Questionnaire for Adult Immunization    Are you sick today?   No   Do you have allergies to medications, food, a vaccine component or latex?   No   Have you ever had a serious reaction after receiving a vaccination?   No   Do you have a long-term health problem with heart, lung, kidney, or metabolic disease (e.g., diabetes), asthma, a blood disorder, no spleen, complement component deficiency, a cochlear implant, or a spinal fluid leak?  Are you on long-term aspirin therapy?   No   Do you have cancer, leukemia, HIV/AIDS, or any other immune system problem?   No   Do you have a parent, brother, or sister with an immune system problem?   No   In the past 3 months, have you taken medications that affect  your immune system, such as prednisone, other steroids, or anticancer drugs; drugs for the treatment of rheumatoid arthritis, Crohn s disease, or psoriasis; or have you had radiation treatments?   No   Have you had a seizure, or a brain or other nervous system problem?   No   During the past year, have you received a transfusion of blood or blood    products, or been given immune (gamma) globulin or antiviral drug?   No   For women: Are you pregnant or is there a chance you could become       pregnant during the next month?   Yes   Have you received any vaccinations in the past 4 weeks?   No     Immunization questionnaire answers were all negative.  RHOGAM was given today in the right ventrogluteal.     Patient instructed to remain in clinic for 15 minutes afterwards, and to report any adverse reactions.     Screening performed by Laurie Jordan MA on 7/17/2023 at 3:01 PM.

## 2023-07-17 NOTE — PROGRESS NOTES
28w0d  Feels well overall. Here alone today. Abhinav on the phone.   Fetal movement: positive, denies loss of fluid/vb/contractions, signs and symptoms preeclampsia    Answered questions on birthing process and hospital procedures relating to water births.     Reviewed PTL precautions, S&S of preeclampsia and fetal movement awareness, patient verbalizes understanding and what to report  Return to clinic 2 weeks    Kenneyd Kirby, DNP, APRN, CNM

## 2023-07-24 ENCOUNTER — MYC MEDICAL ADVICE (OUTPATIENT)
Dept: MIDWIFE SERVICES | Facility: CLINIC | Age: 33
End: 2023-07-24
Payer: COMMERCIAL

## 2023-07-24 NOTE — CONFIDENTIAL NOTE
MyChart message returned to Meredith regarding swelling. Ok to continue to monitor, elevated and use compression socks. Preeclampsia and DVT warning signs given.     GHULAM Norton, CNM

## 2023-07-30 NOTE — PATIENT INSTRUCTIONS
SIGNS OF  LABOR    Labor is  if it happens more than three weeks before your due date.    It can be hard to know if you are in labor, since the symptoms can be like the normal feelings of pregnancy.  Often, the only difference is the symptoms increase or they don't go away.     Signs of  labor can include:    Contractions which can feel like period cramps or gas pain.  You may feel it in the lower part of your abdomen, in your back, or as a pressure feeling in your bottom.  It is often regular, coming every 5 or 10 minutes, and  lasting about 30-60 seconds. Some contractions are normal during pregnancy (Potter dick contractions) but if you are feeling more than 5-6 in one hour that is NOT normal  If this occurs empty your bladder, then drink 2-3 glasses of water, eat a snack, and lay down on your left side. Put your hand on your abdomen to count the contractions.  If after one hour of resting you have still had 5-6 contractions call your clinic right away.    If you feel a pop, gush, or trickle of fluid it may mean that your bag of water has broken and you should contact the clinic     You may also experience loose stools and/or rectal pressure     Listen to your body, if something doesn't seem right please call us at the clinic    Risk Factors    Previous  delivery  Bacterial Vaginosis- if you notice a fishy smell to your discharge or experience vaginal itching/discomfort you should be evaluated for infection  Smoking  Drug abuse  Adolescent (teen) pregnancy or advanced maternal age (AMA) age 35 and over  Dehydration (this may not cause  labor but it can cause contractions)    If you think you are in  labor we may do some lab testing in the clinic or send you to the hospital for evaluation    Please call us if you are concerned you are in  labor.    [facility]  [phone]      Fetal Kick Counts    It is important to know when your baby's  movements occur. We often get busy with work and life and do not pay close attention to their movements.      Women typically begin feeling movement between 18-22 weeks of gestation, sometimes it can be earlier or later depending on where your placenta is     Movements usually begin feeling like popping or fluttering and as the baby grows they become more pronounced    Toward the end of pregnancy as the baby gets larger they may not move as much or make as big of movements. Babies have maturing sleep cycles as well as not as much room to move and flip. If you are ever concerned about your baby's movements or have not felt the baby move for a while, we recommend you do a fetal kick count. Prior to starting your count drink a glass of water or juice and eat a snack. Then lay down on your side and begin to count movements.     How to do a Fetal Kick Counts    There are many different ways to monitor your baby's movements. Movements can range from large jabs to small kicks, or wiggles.  Hiccups count!    Count 10 movements in 2 hours when resting and focusing  Count 10 movements in 12 hours when doing normal activity    We recommend that if movements occur but seem decreased that you should be seen in the clinic or hospital for evaluation within 12 hours. If fetal movement is absent or fetal kick counts are low please contact us right away.    If you ever have any concerns about your baby's movements DO NOT HESITATE to call us, we are here for you!    [facility]  [phone]        PREECLAMPSIA SIGNS AND SYMPTOMS    Preeclampsia is a dangerous condition that some women develop in the second half of pregnancy. It can also begin after the baby is born.  Preeclampsia causes high blood pressure and can cause problems with many organ systems in your body.  It can also affect the growth of your baby. The exact cause of preeclampsia is unknown, however, there are signs and symptoms to watch for:    -A  "bad headache that doesn't improve with Tylenol  -Visual changes such as spots, flashes of light, blurry vision  -Pain in the upper right part of your abdomen, especially under the ribs that doesn't go away  -Nausea and/or vomiting  -Feeling extremely tired  -Yellowing of the skin and/or eyes  -Feeling \"not quite right\" or that something is wrong  -An extreme amount of swelling (some swelling in pregnancy is very normal)    If your midwife feels that you are developing preeclampsia, you will have lab tests drawn and will be monitored very closely.     If you are experiencing anyof these symptoms, call the [facility] immediately at [phone].    "

## 2023-07-30 NOTE — PROGRESS NOTES
30w0d  Feels well overall. Abhinav on the phone for visit  Has some left ankle swelling, seems potentially injury or plantar fasciitis related. No other lower extremity swelling, denies pree symptoms. BP within normal range   Fetal Movement: positive, denies loss of fluid/vb, no contractions, denies signs and symptoms preeclampsia  Fetal kick counts reviewed and handout given  Reviewed PTL precautions, s/sx of preeclampsia, fetal movement counts  Tdap today   Discussed swelling comfort measures and warning signs. Offered ortho referral to evaluate if concerned for injury. She declines at this time but will let us know if symptoms worsen     Return to clinic in 2 weeks    GHULAM Norton, CNM

## 2023-07-31 ENCOUNTER — PRENATAL OFFICE VISIT (OUTPATIENT)
Dept: MIDWIFE SERVICES | Facility: CLINIC | Age: 33
End: 2023-07-31
Payer: COMMERCIAL

## 2023-07-31 VITALS — WEIGHT: 198.8 LBS | BODY MASS INDEX: 30.23 KG/M2 | DIASTOLIC BLOOD PRESSURE: 60 MMHG | SYSTOLIC BLOOD PRESSURE: 102 MMHG

## 2023-07-31 DIAGNOSIS — Z23 NEED FOR TDAP VACCINATION: ICD-10-CM

## 2023-07-31 DIAGNOSIS — Z34.03 ENCOUNTER FOR SUPERVISION OF NORMAL FIRST PREGNANCY IN THIRD TRIMESTER: Primary | ICD-10-CM

## 2023-07-31 PROCEDURE — 90471 IMMUNIZATION ADMIN: CPT | Performed by: ADVANCED PRACTICE MIDWIFE

## 2023-07-31 PROCEDURE — 99207 PR PRENATAL VISIT: CPT | Performed by: ADVANCED PRACTICE MIDWIFE

## 2023-07-31 PROCEDURE — 90715 TDAP VACCINE 7 YRS/> IM: CPT | Performed by: ADVANCED PRACTICE MIDWIFE

## 2023-07-31 RX ORDER — NICOTINE POLACRILEX 4 MG/1
GUM, CHEWING ORAL
COMMUNITY
Start: 2023-07-18 | End: 2023-09-06

## 2023-07-31 NOTE — NURSING NOTE
Prior to immunization administration, verified patients identity using patient s name and date of birth. Please see Immunization Activity for additional information.     Screening Questionnaire for Adult Immunization    Are you sick today?   No   Do you have allergies to medications, food, a vaccine component or latex?   No   Have you ever had a serious reaction after receiving a vaccination?   No   Do you have a long-term health problem with heart, lung, kidney, or metabolic disease (e.g., diabetes), asthma, a blood disorder, no spleen, complement component deficiency, a cochlear implant, or a spinal fluid leak?  Are you on long-term aspirin therapy?   No   Do you have cancer, leukemia, HIV/AIDS, or any other immune system problem?   No   Do you have a parent, brother, or sister with an immune system problem?   No   In the past 3 months, have you taken medications that affect  your immune system, such as prednisone, other steroids, or anticancer drugs; drugs for the treatment of rheumatoid arthritis, Crohn s disease, or psoriasis; or have you had radiation treatments?   No   Have you had a seizure, or a brain or other nervous system problem?   No   During the past year, have you received a transfusion of blood or blood    products, or been given immune (gamma) globulin or antiviral drug?   No   For women: Are you pregnant or is there a chance you could become       pregnant during the next month?   Yes   Have you received any vaccinations in the past 4 weeks?   No     Immunization questionnaire answers were all negative.      Patient instructed to remain in clinic for 15 minutes afterwards, and to report any adverse reactions.     Screening performed by Kalpana Goncalves MA on 7/31/2023 at 11:25 AM.

## 2023-08-13 NOTE — PATIENT INSTRUCTIONS
SIGNS OF  LABOR    Labor is  if it happens more than three weeks before your due date.    It can be hard to know if you are in labor, since the symptoms can be like the normal feelings of pregnancy.  Often, the only difference is the symptoms increase or they don't go away.     Signs of  labor can include:    Contractions which can feel like period cramps or gas pain.  You may feel it in the lower part of your abdomen, in your back, or as a pressure feeling in your bottom.  It is often regular, coming every 5 or 10 minutes, and  lasting about 30-60 seconds. Some contractions are normal during pregnancy ([place] dick contractions) but if you are feeling more than 5-6 in one hour that is NOT normal  If this occurs empty your bladder, then drink 2-3 glasses of water, eat a snack, and lay down on your left side. Put your hand on your abdomen to count the contractions.  If after one hour of resting you have still had 5-6 contractions call your clinic right away.    If you feel a pop, gush, or trickle of fluid it may mean that your bag of water has broken and you should contact the clinic     You may also experience loose stools and/or rectal pressure     Listen to your body, if something doesn't seem right please call us at the clinic    Risk Factors    Previous  delivery  Bacterial Vaginosis- if you notice a fishy smell to your discharge or experience vaginal itching/discomfort you should be evaluated for infection  Smoking  Drug abuse  Adolescent (teen) pregnancy or advanced maternal age (AMA) age 35 and over  Dehydration (this may not cause  labor but it can cause contractions)    If you think you are in  labor we may do some lab testing in the clinic or send you to the hospital for evaluation    Please call us if you are concerned you are in  labor.    [facility]  [phone]  Fetal Kick Counts    It is important to know when your baby's  movements occur. We often get busy with work and life and do not pay close attention to their movements.      Women typically begin feeling movement between 18-22 weeks of gestation, sometimes it can be earlier or later depending on where your placenta is     Movements usually begin feeling like popping or fluttering and as the baby grows they become more pronounced    Toward the end of pregnancy as the baby gets larger they may not move as much or make as big of movements. Babies have maturing sleep cycles as well as not as much room to move and flip. If you are ever concerned about your baby's movements or have not felt the baby move for a while, we recommend you do a fetal kick count. Prior to starting your count drink a glass of water or juice and eat a snack. Then lay down on your side and begin to count movements.     How to do a Fetal Kick Counts    There are many different ways to monitor your baby's movements. Movements can range from large jabs to small kicks, or wiggles.  Hiccups count!    Count 10 movements in 2 hours when resting and focusing  Count 10 movements in 12 hours when doing normal activity    We recommend that if movements occur but seem decreased that you should be seen in the clinic or hospital for evaluation within 12 hours. If fetal movement is absent or fetal kick counts are low please contact us right away.    If you ever have any concerns about your baby's movements DO NOT HESITATE to call us, we are here for you!    [facility]  [phone]   PREECLAMPSIA SIGNS AND SYMPTOMS    Preeclampsia is a dangerous condition that some women develop in the second half of pregnancy. It can also begin after the baby is born.  Preeclampsia causes high blood pressure and can cause problems with many organ systems in your body.  It can also affect the growth of your baby. The exact cause of preeclampsia is unknown, however, there are signs and symptoms to watch for:    -A bad  "headache that doesn't improve with Tylenol  -Visual changes such as spots, flashes of light, blurry vision  -Pain in the upper right part of your abdomen, especially under the ribs that doesn't go away  -Nausea and/or vomiting  -Feeling extremely tired  -Yellowing of the skin and/or eyes  -Feeling \"not quite right\" or that something is wrong  -An extreme amount of swelling (some swelling in pregnancy is very normal)    If your midwife feels that you are developing preeclampsia, you will have lab tests drawn and will be monitored very closely.     If you are experiencing anyof these symptoms, call the [facility] immediately at [phone].   Weeks 32 to 34 of Your Pregnancy: Care Instructions    Decide whether you want to bank or donate your baby's umbilical cord blood. If you want to save this blood, you have to arrange for it ahead of time.   Decide about circumcision. Personal, Advent, or cultural beliefs may play a role in your decision. You get to decide what you want for your baby.     Learn how to ease hemorrhoids.    Get more liquids, fruits, vegetables, and fiber in your diet.  Avoid sitting for too long.  Clean yourself with moist toilet paper. Or try witch hazel pads.  Try ice packs or warm sitz baths for discomfort.  Use hydrocortisone cream for pain or itching.  Ask your doctor about stool softeners.    Consider the benefits of breastfeeding.    It reduces your baby's risk of sudden infant death syndrome (SIDS).   babies are less likely to get certain infections. And they're less likely to be obese or get diabetes later in life.  It can lower your risk of breast and ovarian cancers and osteoporosis.  It saves you money.  Follow-up care is a key part of your treatment and safety. Be sure to make and go to all appointments, and call your doctor if you are having problems. It's also a good idea to know your test results and keep a list of the medicines you take.  Where " "can you learn more?  Go to [email or web address]/patiented  Enter X711 in the search box to learn more about \"Weeks 32 to 34 of Your Pregnancy: Care Instructions.\"  Current as of: November 9, 2022               Content Version: 13.7    6500-1661 [employer].   Care instructions adapted under license by your healthcare professional. If you have questions about a medical condition or this instruction, always ask your healthcare professional. [employer] disclaims any warranty or liability for your use of this information.      You have been provided the Any Day Now: Early Labor at Home document.    Additional copies can be found here:  [email or web address]  You have been provided the What I'd Wish I'd Known About Giving Birth document.    Additional copies can be found here:  [email or web address]  "

## 2023-08-13 NOTE — PROGRESS NOTES
31w6d  Feels ***  Fetal Movement: {Positive / Negative:777053}, denies loss of fluid/vb, {SOME:091030} contractions, denies signs and symptoms preeclampsia  Peds chosen: {YES NO:750020}  Pediatrician: Hep B, Vit K, Erythromycin and circumcision; advised to check insurance for hospital vs clinic    Plans to breastfeed {YES NO:486807}  Breastfeeding class planned {YES NO:232967}     Fetal kick counts/movements reviewed  Reviewed danger signs, signs and symptoms PTL, and s/sx of preeclampsia     Return to clinic 2 weeks    ***

## 2023-08-14 ENCOUNTER — PRENATAL OFFICE VISIT (OUTPATIENT)
Dept: MIDWIFE SERVICES | Facility: CLINIC | Age: 33
End: 2023-08-14
Payer: COMMERCIAL

## 2023-08-14 VITALS — DIASTOLIC BLOOD PRESSURE: 60 MMHG | WEIGHT: 198.4 LBS | BODY MASS INDEX: 30.17 KG/M2 | SYSTOLIC BLOOD PRESSURE: 100 MMHG

## 2023-08-14 DIAGNOSIS — Z34.03 ENCOUNTER FOR SUPERVISION OF NORMAL FIRST PREGNANCY IN THIRD TRIMESTER: Primary | ICD-10-CM

## 2023-08-14 PROCEDURE — 99207 PR PRENATAL VISIT: CPT | Performed by: ADVANCED PRACTICE MIDWIFE

## 2023-08-14 NOTE — PROGRESS NOTES
32w0d  Feels well overall. Abhinav present on the phone during this visit.  Fetal Movement: positive, denies loss of fluid/vb, no regular contractions, denies signs and symptoms preeclampsia  Peds chosen: Yes  Pediatrician: Hep B, Vit K, Erythromycin. Not planning a circumcision  Plans to breastfeed Yes  Breastfeeding class planned: Not a specific class, but did do the natural childbirth class through [facility] is a LC  Birth plan questions reviewed regarding nitrous oxide, pain meds in labor and after, medication safe with breastfeeding, NSTs, cont EFM vs IA.   Advised to make a list of questions, if CNM unable to answer then an appropriate resource person can be asked.   Abhinav and Meredith will continue working on their birth plan to discuss at future visits  Denies questions on danger signs, signs and symptoms PTL, and s/sx of preeclampsia     Return to clinic 2 weeks    Damaris PARMAR CNM

## 2023-08-25 NOTE — PROGRESS NOTES
34w  Feels well, recording visit for Abhinav. Questions about RSV vaccine versus monoclonal antibodies for baby, unsure of best course of action, system has not decided on vaccine yet but thinks she would need to have vaccine by 36w, unsure if timing will work out  Fetal Movement: positive, denies loss of fluid/vb, a few BH, no regular contractions  Taking hospital tour?  No tour link given, map given, discussed entry at door 6 and parking, ED after hour, L&D on second floor  Have car seat-Yes  Discussed GBS/cx check at next visit  Fetal kick counts discussed-very active baby  We have not be notified of RSV vaccines yet, but will keep her posted  Discussed birth preferences and AMTSL. Will consider pitocin but leaning toward not. Will send document ahead of next visit  BSUS next visitto verify position  Return to clinic 2 weeks    GHULAM Segura, CNM

## 2023-08-28 ENCOUNTER — PRENATAL OFFICE VISIT (OUTPATIENT)
Dept: MIDWIFE SERVICES | Facility: CLINIC | Age: 33
End: 2023-08-28
Payer: COMMERCIAL

## 2023-08-28 VITALS — DIASTOLIC BLOOD PRESSURE: 66 MMHG | WEIGHT: 202 LBS | SYSTOLIC BLOOD PRESSURE: 100 MMHG | BODY MASS INDEX: 30.71 KG/M2

## 2023-08-28 DIAGNOSIS — Z34.03 ENCOUNTER FOR SUPERVISION OF NORMAL FIRST PREGNANCY IN THIRD TRIMESTER: Primary | ICD-10-CM

## 2023-08-28 PROCEDURE — 99207 PR PRENATAL VISIT: CPT | Performed by: ADVANCED PRACTICE MIDWIFE

## 2023-08-28 NOTE — PATIENT INSTRUCTIONS
Weeks 34 to 36 of Your Pregnancy: Care Instructions  Your belly has grown quite large. It's almost time to give birth! Your baby's lungs are almost ready to breathe air. The skull bones are firm enough to protect your baby's head. But they're soft enough to move down through the birth canal.    You might be wondering what to expect during labor. Because each birth is different, there's no way to know exactly what childbirth will be like for you. Talk to your doctor or midwife about any concerns you have.   You'll probably have a test for group B streptococcus (GBS). GBS is bacteria that can live in the vagina and rectum. GBS can make your baby sick after birth. If you test positive, you'll get antibiotics during labor.     Choose what type of pain relief you would like during labor.  You can choose from a few types, including medicine and non-medicine options. You may want to use several types of pain relief.     Know how medicines can help with pain during labor.  Some medicines lower anxiety and help with some of the pain. Others make your lower body numb so that you will feel less pain.     Tell your doctor about your pain medicine choice.  Do this before you start labor or very early in your labor. You may be able to change your mind during labor.     Learn about the stages of labor.    The first stage includes the early (latent) and active phases of labor. Contractions start in early labor. During active labor, contractions get stronger, last longer, and happen more often. And the cervix opens more rapidly.  The second stage starts when you're ready to push. During this stage, your baby is born.  During the third stage, you'll usually have a few more contractions to push out the placenta.   Follow-up care is a key part of your treatment and safety. Be sure to make and go to all appointments, and call your doctor if you are having problems. It's also a good idea to know your test results and keep a list of the  "medicines you take.  Where can you learn more?  Go to https://www.EyeNetra.net/patiented  Enter B912 in the search box to learn more about \"Weeks 34 to 36 of Your Pregnancy: Care Instructions.\"  Current as of: 2022               Content Version: 13.7    5439-0686 [employer].   Care instructions adapted under license by your healthcare professional. If you have questions about a medical condition or this instruction, always ask your healthcare professional. [employer] disclaims any warranty or liability for your use of this information.      Group B Strep During Pregnancy: Care Instructions  Overview     Group B strep infection is caused by a type of bacteria. It's a different kind of bacteria than the kind that causes strep throat.  You may have this kind of bacteria in your body. Sometimes it may cause an infection, but most of the time it doesn't make you sick or cause symptoms. But if you pass the bacteria to your baby during the birth, it can cause serious health problems for your baby.  If you have this bacteria in your body, you will get antibiotics when you are in labor. Antibiotics help prevent problems for a  baby.  After birth, doctors will watch and may test your baby. If your baby tests positive for Group B strep, your baby will get antibiotics.  If you plan to breastfeed your baby, don't worry. It will be safe to breastfeed.  Follow-up care is a key part of your treatment and safety. Be sure to make and go to all appointments, and call your doctor if you are having problems. It's also a good idea to know your test results and keep a list of the medicines you take.  How can you care for yourself at home?  If your doctor has prescribed antibiotics, take them as directed. Do not stop taking them just because you feel better. You need to take the full course of antibiotics.  Tell your doctor if you are allergic to any antibiotic.  If you go into labor, or " "your water breaks, go to the hospital. Your doctor will give you antibiotics to help protect your baby from infection.  Tell the doctors and nurses if you have an allergy to penicillin.  Tell the doctors and nurses at the hospital that you tested positive for group B strep.  When should you call for help?   Call your doctor now or seek immediate medical care if:    You have symptoms of a urinary tract infection. These may include:  Pain or burning when you urinate.  A frequent need to urinate without being able to pass much urine.  Pain in the flank, which is just below the rib cage and above the waist on either side of the back.  Blood in your urine.  A fever.     You think you are in labor or your water has broken.     You have pain in your belly or pelvis.   Watch closely for changes in your health, and be sure to contact your doctor if you have any problems.  Where can you learn more?  Go to https://www.myQaa.Postmaster/patiented  Enter M001 in the search box to learn more about \"Group B Strep During Pregnancy: Care Instructions.\"  Current as of: October 31, 2022               Content Version: 13.7    1177-2851 [employer].   Care instructions adapted under license by your healthcare professional. If you have questions about a medical condition or this instruction, always ask your healthcare professional. [employer] disclaims any warranty or liability for your use of this information.      Circumcision in Infants: What to Expect at Home  Your Child's Recovery  After circumcision, your baby's penis may look red and swollen. It may have petroleum jelly and gauze on it. The gauze will likely come off when your baby urinates. Follow your doctor's directions about whether to put clean gauze back on your baby's penis or to leave the gauze off. If you need to remove gauze from the penis, use warm water to soak the gauze and gently loosen it.  The doctor may have used a Plastibell device to do " the circumcision. If so, your baby will have a plastic ring around the head of the penis. The ring should fall off by itself in 10 to 12 days.  A thin, yellow film may form over the area the day after the procedure. This is part of the normal healing process. It should go away in a few days.  Your baby may seem fussy while the area heals. It may hurt for your baby to urinate. This pain often gets better in 3 or 4 days. But it may last for up to 2 weeks.  Even though your baby's penis will likely start to feel better after 3 or 4 days, it may look worse. The penis often starts to look like it's getting better after about 7 to 10 days.  This care sheet gives you a general idea about how long it will take for your child to recover. But each child recovers at a different pace. Follow the steps below to help your child get better as quickly as possible.  How can you care for your child at home?  Activity    Let your baby rest as much as possible. Sleeping will help with recovery.     You can give your baby a sponge bath the day after surgery. Ask your doctor when it is okay to give your baby a bath.   Medicines    Your doctor will tell you if and when your child can restart any medicines. The doctor will also give you instructions about your child taking any new medicines.     Your doctor may recommend giving your baby acetaminophen (Tylenol) to help with pain after the procedure. Be safe with medicines. Give your child medicines exactly as prescribed. Call your doctor if you think your child is having a problem with a medicine.     Do not give your child two or more pain medicines at the same time unless the doctor told you to. Many pain medicines have acetaminophen, which is Tylenol. Too much acetaminophen (Tylenol) can be harmful.   Circumcision care    Always wash your hands before and after touching the circumcision area.     Gently wash your baby's penis with plain, warm water after each diaper change, and pat it  "dry. Do not use soap. Don't use hydrogen peroxide or alcohol. They can slow healing.     Do not try to remove the film that forms on the penis. The film will go away on its own.     Put plenty of petroleum jelly (such as Vaseline) on the circumcision area during each diaper change. This will prevent your baby's penis from sticking to the diaper while it heals.     Fasten your baby's diapers loosely so that there is less pressure on the penis while it heals.   Follow-up care is a key part of your child's treatment and safety. Be sure to make and go to all appointments, and call your doctor if your child is having problems. It's also a good idea to know your child's test results and keep a list of the medicines your child takes.  When should you call for help?   Call your doctor now or seek immediate medical care if:    Your baby has a fever over 100.4 F.     Your baby is extremely fussy or irritable, has a high-pitched cry, or refuses to eat.     Your baby does not have a wet diaper within 12 hours after the circumcision.     You find a spot of bleeding larger than a 2-inch [employer] from the incision.     Your baby has signs of infection. Signs may include severe swelling; redness; a red streak on the shaft of the penis; or a thick, yellow discharge.   Watch closely for changes in your child's health, and be sure to contact your doctor if:    A Plastibell device was used for the circumcision and the ring has not fallen off after 10 to 12 days.   Where can you learn more?  Go to https://www.[employer].net/patiented  Enter S255 in the search box to learn more about \"Circumcision in Infants: What to Expect at Home.\"  Current as of: March 1, 2023               Content Version: 13.7    8636-4191 [employer].   Care instructions adapted under license by your healthcare professional. If you have questions about a medical condition or this instruction, always ask your healthcare professional. [employer] " Incorporated disclaims any warranty or liability for your use of this information.      In the current pandemic we are unable to accommodate in person tours but [facility] has put together a virtual tour for you and your partner/support person to view. The link is attached below.    [email or web address]    -The [facility] Midwife Team    Where to go:  Door 6 entry 8 am to 830 pm  Emergency room after 830 pm until 8 am  Labor and delivery 2nd floor off the elevators

## 2023-09-06 ENCOUNTER — E-VISIT (OUTPATIENT)
Dept: OBGYN | Facility: CLINIC | Age: 33
End: 2023-09-06
Payer: COMMERCIAL

## 2023-09-06 DIAGNOSIS — Z34.03 ENCOUNTER FOR SUPERVISION OF NORMAL FIRST PREGNANCY IN THIRD TRIMESTER: ICD-10-CM

## 2023-09-06 DIAGNOSIS — R12 HEARTBURN: ICD-10-CM

## 2023-09-06 DIAGNOSIS — R12 HEARTBURN: Primary | ICD-10-CM

## 2023-09-06 RX ORDER — NICOTINE POLACRILEX 4 MG/1
20 GUM, CHEWING ORAL DAILY
Qty: 30 TABLET | Refills: 0 | Status: SHIPPED | OUTPATIENT
Start: 2023-09-06 | End: 2023-09-06

## 2023-09-06 RX ORDER — NICOTINE POLACRILEX 4 MG/1
20 GUM, CHEWING ORAL DAILY
Qty: 30 TABLET | Refills: 0 | Status: ON HOLD | OUTPATIENT
Start: 2023-09-06 | End: 2023-10-13

## 2023-09-06 NOTE — PATIENT INSTRUCTIONS
Thank you for choosing us for your care. I have placed an order for a prescription so that you can start treatment. View your full visit summary for details by clicking on the link below. Your pharmacist will able to address any questions you may have about the medication.      If you're not feeling better within 2 weeks please schedule an appointment.  You can schedule an appointment right here in [facility], or call [phone]  If the visit is for the same symptoms as your eVisit, we'll refund the cost of your eVisit if seen within seven days.

## 2023-09-11 ENCOUNTER — PRENATAL OFFICE VISIT (OUTPATIENT)
Dept: MIDWIFE SERVICES | Facility: CLINIC | Age: 33
End: 2023-09-11
Payer: COMMERCIAL

## 2023-09-11 ENCOUNTER — MEDICAL CORRESPONDENCE (OUTPATIENT)
Dept: HEALTH INFORMATION MANAGEMENT | Facility: CLINIC | Age: 33
End: 2023-09-11

## 2023-09-11 VITALS — WEIGHT: 200.8 LBS | BODY MASS INDEX: 30.53 KG/M2 | DIASTOLIC BLOOD PRESSURE: 72 MMHG | SYSTOLIC BLOOD PRESSURE: 108 MMHG

## 2023-09-11 DIAGNOSIS — O99.013 ANEMIA AFFECTING PREGNANCY IN THIRD TRIMESTER: Primary | ICD-10-CM

## 2023-09-11 DIAGNOSIS — Z3A.36 36 WEEKS GESTATION OF PREGNANCY: ICD-10-CM

## 2023-09-11 DIAGNOSIS — Z34.03 ENCOUNTER FOR SUPERVISION OF NORMAL FIRST PREGNANCY IN THIRD TRIMESTER: ICD-10-CM

## 2023-09-11 DIAGNOSIS — Z36.85 ANTENATAL SCREENING FOR STREPTOCOCCUS B: ICD-10-CM

## 2023-09-11 LAB
ERYTHROCYTE [DISTWIDTH] IN BLOOD BY AUTOMATED COUNT: 13.4 % (ref 10–15)
FERRITIN SERPL-MCNC: 52 NG/ML (ref 6–175)
HCT VFR BLD AUTO: 37.2 % (ref 35–47)
HGB BLD-MCNC: 12.5 G/DL (ref 11.7–15.7)
HOLD SPECIMEN: NORMAL
IRON BINDING CAPACITY (ROCHE): 435 UG/DL (ref 240–430)
IRON SATN MFR SERPL: 24 % (ref 15–46)
IRON SERPL-MCNC: 103 UG/DL (ref 37–145)
MCH RBC QN AUTO: 31.6 PG (ref 26.5–33)
MCHC RBC AUTO-ENTMCNC: 33.6 G/DL (ref 31.5–36.5)
MCV RBC AUTO: 94 FL (ref 78–100)
PLATELET # BLD AUTO: 372 10E3/UL (ref 150–450)
RBC # BLD AUTO: 3.96 10E6/UL (ref 3.8–5.2)
WBC # BLD AUTO: 16.1 10E3/UL (ref 4–11)

## 2023-09-11 PROCEDURE — 87653 STREP B DNA AMP PROBE: CPT | Performed by: NURSE PRACTITIONER

## 2023-09-11 PROCEDURE — 85027 COMPLETE CBC AUTOMATED: CPT | Performed by: NURSE PRACTITIONER

## 2023-09-11 PROCEDURE — 82728 ASSAY OF FERRITIN: CPT | Performed by: NURSE PRACTITIONER

## 2023-09-11 PROCEDURE — 36415 COLL VENOUS BLD VENIPUNCTURE: CPT | Performed by: NURSE PRACTITIONER

## 2023-09-11 PROCEDURE — 83540 ASSAY OF IRON: CPT | Performed by: NURSE PRACTITIONER

## 2023-09-11 PROCEDURE — 83550 IRON BINDING TEST: CPT | Performed by: NURSE PRACTITIONER

## 2023-09-11 PROCEDURE — 99207 PR PRENATAL VISIT: CPT | Performed by: NURSE PRACTITIONER

## 2023-09-11 NOTE — PROGRESS NOTES
36w0d  Here with Abhinav today.  Feels gerally good, some fatigue.    Fetal movement: positive  Denies bleeding/lof, a little contractions, denies signs and symptoms preeclampsia  GBS swab done today  Vaginal exam done, confirmed vertex via leopolds and bedside US  Cx:  cl/thick/high     Labor preferences/birth plan reviewed, sent for scanning.  Hoping for water birth, laboring at home, declines PP pitocin unless excess bleeding.  Reviewed remainder of birth plan, WB handout provided.    Breast pump Rx no already has  CBC drawn today: yes    Labor instructions given  Reviewed fetal kick/movement counts  Warning signs and signs and symptoms preeclampsia reviewed    Return to clinic 1 week    Kim PARMAR CNM, [facility]  [phone]

## 2023-09-12 LAB — GP B STREP DNA SPEC QL NAA+PROBE: NEGATIVE

## 2023-09-18 ENCOUNTER — PRENATAL OFFICE VISIT (OUTPATIENT)
Dept: MIDWIFE SERVICES | Facility: CLINIC | Age: 33
End: 2023-09-18
Payer: COMMERCIAL

## 2023-09-18 VITALS — BODY MASS INDEX: 31.11 KG/M2 | DIASTOLIC BLOOD PRESSURE: 64 MMHG | SYSTOLIC BLOOD PRESSURE: 104 MMHG | WEIGHT: 204.6 LBS

## 2023-09-18 DIAGNOSIS — Z34.03 ENCOUNTER FOR SUPERVISION OF NORMAL FIRST PREGNANCY IN THIRD TRIMESTER: Primary | ICD-10-CM

## 2023-09-18 PROCEDURE — 99207 PR PRENATAL VISIT: CPT | Performed by: ADVANCED PRACTICE MIDWIFE

## 2023-09-18 NOTE — PROGRESS NOTES
37w0d  Feels well overall. Here alone   Fetal movement: positive  Denies vaginal bleeding/lof, no contractions, denies signs and symptoms preeclampsia    Fetal kick/movement handout provided  Labor signs and symptoms AVS provided  Aware of warning signs, signs and symptoms of preeclampsia    Return to clinic 1 week  Orders placed for BPPs for last 3 prenatal visits.    Damaris PARMAR CNM

## 2023-09-18 NOTE — PATIENT INSTRUCTIONS
"Labor Instructions for Midwife Patients    When to call:  Both during and after office hours call  [phone]. There is a triage RN to take your calls and answer your questions 24 hours a day.  If they cannot answer your question they will page the midwife on call for you.    When to call:  Call anytime you have important concerns about you or your baby.     Call if:  You are having contractions at regular intervals about 5-6 minutes apart lasting 30-60 seconds and becoming increasingly more intense   You have an uncontrollable gush of fluid from your vagina or feel a pop and gush like your water has broken  You have HEAVY bleeding, like heavy period, blood running down your legs, or  soaking a pad.   Some bleeding after a pelvic exam, after intercourse, or in labor when your cervix is dilating is normal and is referred to as \"bloody show\"  You have severe, continuous back or abdominal pain  You feel it is time to go to the hospital  If this is your first labor, call when contractions are very intense and have been about every 3-4 minutes for about an hour  If it is your second labor or more, call when contractions are strong and about every 3-5 minutes or sooner depending on your level of discomfort.     Keep in mind we are always here for you! If you have questions, concerns please don't hesitate to call us.     What to eat/drink in labor: Drink plenty of fluid (water most importantly, juice, soda or tea without caffeine). Eat rice, pasta, soup, cereal, bread/toast, and fruit. Avoid dairy and greasy food as they are difficult to digest and you may experience some nausea during labor.    Comfort measures:  Baths and showers (ok even with ruptured membranes, it may temporarily slow contractions if you are still in the early stage of labor)  Warm/hot packs for back pain or discomfort  Back, belly, or thigh massages  Standing, rocking, walking, leaning over bed or tables, side-lying and sleeping    Miscellaneous: "   Contractions are timed from the beginning of one to the beginning of the next  Try hard to sleep during the early stage of labor when you are not that uncomfortable. Timing of contractions at this point is not important  Even if you cannot sleep, resting in bed or on the couch can help you maintain your energy for labor  When you arrive at the hospital the nurse will check your baby's heartbeat, check your cervix, and will call us. The midwife on call will come in and be with you when you are in active labor  After hours you need to enter the hospital through the emergency room               Post Dates Management    If you've gone beyond your due date you are probably thinking when is this baby coming!  Most babies are born on or after their due date so it is nothing to worry about.    If you are pregnant at 40 weeks we will start some increased monitoring to make sure that your baby and the placenta are healthy enough to continue your pregnancy.    We would have you come to the clinic every 3-4 days to be assessed with an ultrasound called a Biophysical profile (BPP).     This tells us how your baby is doing. We monitor fetal movement, breathing patterns, amniotic fluid level, and heart rate.     Research has shown that by 42 weeks gestation the placenta is not always healthy enough to support the pregnancy further and it is better for the baby to be born.  If you are still pregnant by 41 and a half weeks we will discuss induction of labor with you and the different options available.       PREECLAMPSIA SIGNS AND SYMPTOMS    Preeclampsia is a dangerous condition that some women develop in the second half of pregnancy. It can also begin after the baby is born.  Preeclampsia causes high blood pressure and can cause problems with many organ systems in your body.  It can also affect the growth of your baby. The exact cause of preeclampsia is unknown, however, there are signs and symptoms to watch for:    -A bad  "headache that doesn't improve with Tylenol  -Visual changes such as spots, flashes of light, blurry vision  -Pain in the upper right part of your abdomen, especially under the ribs that doesn't go away  -Nausea and/or vomiting  -Feeling extremely tired  -Yellowing of the skin and/or eyes  -Feeling \"not quite right\" or that something is wrong  -An extreme amount of swelling (some swelling in pregnancy is very normal)    If your midwife feels that you are developing preeclampsia, you will have lab tests drawn and will be monitored very closely.     If you are experiencing anyof these symptoms, call the [facility] immediately at [phone].    Fetal Kick Counts    It is important to know when your baby's movements occur. We often get busy with work and life and do not pay close attention to their movements.      Women typically begin feeling movement between 18-22 weeks of gestation, sometimes it can be earlier or later depending on where your placenta is     Movements usually begin feeling like popping or fluttering and as the baby grows they become more pronounced    Toward the end of pregnancy as the baby gets larger they may not move as much or make as big of movements. Babies have maturing sleep cycles as well as not as much room to move and flip. If you are ever concerned about your baby's movements or have not felt the baby move for a while, we recommend you do a fetal kick count. Prior to starting your count drink a glass of water or juice and eat a snack. Then lay down on your side and begin to count movements.       COMMON MEDICATIONS AFTER GIVING BIRTH    After giving birth, it is common to be prescribed medications that are available over the counter.  It is recommended that you purchase these medications before you have your baby and have at home.  The common medications are:  Colace 100mg (generic name docusate sodium)  Motrin or Advil 200mg (generic name ibuprofen)  Tylenol 325-500mg " (generic name acetaminophen)    If you are unable to find the medications or would prefer to have prescriptions sent to a pharmacy, that can be done when you are being discharged from the hospital.  If you have any questions or concerns, please do not hesitate to contact our clinic: [phone].       Week 37 of Your Pregnancy: Care Instructions    Most babies are born between 37 and 40 weeks.   This is a good time to pack a bag to take with you to the birth. Then it will be ready to go when you are.     Learn about breastfeeding.  For example, find out about ways to hold your baby to make breastfeeding easier. And think about learning how to pump and store milk.     Know that crying is normal.  It's common for babies to cry 1 to 3 hours a day. Some cry more, and some cry less.     Learn why babies cry.  They may be hungry; have gas; need a diaper change; or feel cold, warm, tired, lonely, or tense. Sometimes they cry for unknown reasons.     Think about what will help you stay calm when your baby cries.  Taking slow, deep breaths can help. So can taking a break. It's okay to put your baby somewhere safe (like their crib) and walk away for a few minutes.     Learn about safe sleep for your baby.  Always put your baby to sleep on their back. Place them alone in a crib or bassinet with a firm, flat surface. Keep soft items like stuffed animals out of the crib.     Learn what to expect with  poop.  Your baby will have their own bowel patterns. Some babies have several bowel movements a day. Some have fewer.     Know that  babies will often have loose, yellow bowel movements.  Formula-fed babies have more formed stools. If your baby's poop looks like pellets, your baby is constipated.   Follow-up care is a key part of your treatment and safety. Be sure to make and go to all appointments, and call your doctor if you are having problems. It's also a good idea to know your test results and keep a list of  "the medicines you take.  Where can you learn more?  Go to https://[email or web address].net/patiented  Enter N257 in the search box to learn more about \"Week 37 of Your Pregnancy: Care Instructions.\"  Current as of: November 9, 2022               Content Version: 13.7    9291-2477 [employer].   Care instructions adapted under license by your healthcare professional. If you have questions about a medical condition or this instruction, always ask your healthcare professional. [employer] disclaims any warranty or liability for your use of this information.      "

## 2023-09-25 ENCOUNTER — PRENATAL OFFICE VISIT (OUTPATIENT)
Dept: MIDWIFE SERVICES | Facility: CLINIC | Age: 33
End: 2023-09-25
Payer: COMMERCIAL

## 2023-09-25 VITALS — BODY MASS INDEX: 31.38 KG/M2 | WEIGHT: 206.4 LBS | DIASTOLIC BLOOD PRESSURE: 72 MMHG | SYSTOLIC BLOOD PRESSURE: 92 MMHG

## 2023-09-25 DIAGNOSIS — Z34.03 ENCOUNTER FOR SUPERVISION OF NORMAL FIRST PREGNANCY IN THIRD TRIMESTER: Primary | ICD-10-CM

## 2023-09-25 PROCEDURE — 99207 PR PRENATAL VISIT: CPT | Performed by: ADVANCED PRACTICE MIDWIFE

## 2023-09-25 NOTE — PROGRESS NOTES
38w0d  Feels well overall. Here alone today.   Fetal movement: positive  Denies vaginal bleeding/lof, no contractions, denies signs and symptoms preeclampsia    Cx: 1 external os/ not able to reach internal os/ thick/ high/ soft/ posterior    Fetal kick/movement counts reviewed  Labor signs and symptoms discussed, aware of numbers to call  Discussed warning signs, signs and symptoms preeclampsia    Return to clinic 1 week    Kennedy Kirby, DNP, APRN, CNM

## 2023-09-25 NOTE — PATIENT INSTRUCTIONS
"Labor Instructions for Midwife Patients    When to call:  Both during and after office hours call  [phone]. There is a triage RN to take your calls and answer your questions 24 hours a day.  If they cannot answer your question they will page the midwife on call for you.    When to call:  Call anytime you have important concerns about you or your baby.     Call if:  You are having contractions at regular intervals about 5-6 minutes apart lasting 60 seconds and becoming increasingly more intense   You have an uncontrollable gush of fluid from your vagina or feel a pop and gush like your water has broken  You have HEAVY bleeding, like heavy period, blood running down your legs, or  soaking a pad.   Some bleeding after a pelvic exam, after intercourse, or in labor when your cervix is dilating is normal and is referred to as \"bloody show\"  You have severe, continuous back or abdominal pain  You feel it is time to go to the hospital  If this is your first labor, call when contractions are very intense and have been about every 3-4 minutes for about an hour  If it is your second labor or more, call when contractions are strong and about every 3-5 minutes or sooner depending on your level of discomfort.     Keep in mind we are always here for you! If you have questions, concerns please don't hesitate to call us.     What to eat/drink in labor: Drink plenty of fluid (water most importantly, juice, soda or tea without caffeine). Eat rice, pasta, soup, cereal, bread/toast, and fruit. Avoid dairy and greasy food as they are difficult to digest and you may experience some nausea during labor.    Comfort measures:  Baths and showers (ok even with ruptured membranes, it may temporarily slow contractions if you are still in the early stage of labor)  Warm/hot packs for back pain or discomfort  Back, belly, or thigh massages  Standing, rocking, walking, leaning over bed or tables, side-lying and sleeping    Miscellaneous: "   Contractions are timed from the beginning of one to the beginning of the next  Try hard to sleep during the early stage of labor when you are not that uncomfortable. Timing of contractions at this point is not important  Even if you cannot sleep, resting in bed or on the couch can help you maintain your energy for labor  When you arrive at the hospital the nurse will check your baby's heartbeat, check your cervix, and will call us. The midwife on call will come in and be with you when you are in active labor  From 7am to 11pm, you can park in the East ramp and enter the hospital through Door 6  After hours you need to enter the hospital through the emergency room

## 2023-09-29 ENCOUNTER — MYC MEDICAL ADVICE (OUTPATIENT)
Dept: MIDWIFE SERVICES | Facility: CLINIC | Age: 33
End: 2023-09-29
Payer: COMMERCIAL

## 2023-09-29 DIAGNOSIS — O26.899 CARPAL TUNNEL SYNDROME DURING PREGNANCY: Primary | ICD-10-CM

## 2023-09-29 DIAGNOSIS — G56.00 CARPAL TUNNEL SYNDROME DURING PREGNANCY: Primary | ICD-10-CM

## 2023-09-29 NOTE — TELEPHONE ENCOUNTER
Pt wondering if steroid injections for carpel tunnel are OK in pregnancy  Routing pt mychart message to provider to advise.  Aleida Ann RN on 9/29/2023 at 10:25 AM

## 2023-10-02 ENCOUNTER — PRENATAL OFFICE VISIT (OUTPATIENT)
Dept: MIDWIFE SERVICES | Facility: CLINIC | Age: 33
End: 2023-10-02
Payer: COMMERCIAL

## 2023-10-02 VITALS
DIASTOLIC BLOOD PRESSURE: 62 MMHG | SYSTOLIC BLOOD PRESSURE: 110 MMHG | HEIGHT: 68 IN | BODY MASS INDEX: 32.01 KG/M2 | WEIGHT: 211.2 LBS

## 2023-10-02 DIAGNOSIS — Z34.03 ENCOUNTER FOR SUPERVISION OF NORMAL FIRST PREGNANCY IN THIRD TRIMESTER: Primary | ICD-10-CM

## 2023-10-02 PROCEDURE — 99207 PR PRENATAL VISIT: CPT

## 2023-10-02 NOTE — PATIENT INSTRUCTIONS
"Week 39 of Your Pregnancy: Care Instructions     babies can look different than what you see in pictures or movies. Their heads can be a strange shape right after birth. And they may have swollen eyes and red marks on their faces.   You can still get pregnant even if you are breastfeeding. If you don't want to get pregnant, talk to your doctor about birth control.   Tips for week 39 of pregnancy  If you plan to breastfeed, get prepared.     Continue to eat healthy foods.  Keep taking your prenatal vitamins during breastfeeding if your doctor recommends it.  Talk to your doctor before taking any medicines or supplements.  Choose the right birth control for you.     Intrauterine devices (IUDs) are placed in the uterus. Sometimes the IUD can be placed right after giving birth. They work for years.  Hormonal implants are placed under the skin of the arm. They also work for years.  Depo-Provera is a shot. You get it every 3 months.  Birth control pills can be used. They're taken every day.  Tubal ligation (tying your tubes) and vasectomy are surgeries. They're permanent.  Diaphragms, spermicide, and condoms must be used each time you have sex. If you used a diaphragm before, you should get refitted after the baby is born.  A birth control patch or ring can be used. These just can't be started until several weeks after you give birth.  Follow-up care is a key part of your treatment and safety. Be sure to make and go to all appointments, and call your doctor if you are having problems. It's also a good idea to know your test results and keep a list of the medicines you take.  Where can you learn more?  Go to https://www.[employer].net/patiented  Enter A811 in the search box to learn more about \"Week 39 of Your Pregnancy: Care Instructions.\"  Current as of: 2022               Content Version: 13.7    6625-0309 [email or web address]   Care instructions adapted under license by your healthcare " "professional. If you have questions about a medical condition or this instruction, always ask your healthcare professional. [employer] disclaims any warranty or liability for your use of this information.    Labor Instructions for Midwife Patients    When to call:  Both during and after office hours call  [phone]. There is a triage RN to take your calls and answer your questions 24 hours a day.  If they cannot answer your question they will page the midwife on call for you.    When to call:  Call anytime you have important concerns about you or your baby.     Call if:  You are having contractions at regular intervals about 5-6 minutes apart lasting 60 seconds and becoming increasingly more intense   You have an uncontrollable gush of fluid from your vagina or feel a pop and gush like your water has broken  You have HEAVY bleeding, like heavy period, blood running down your legs, or  soaking a pad.   Some bleeding after a pelvic exam, after intercourse, or in labor when your cervix is dilating is normal and is referred to as \"bloody show\"  You have severe, continuous back or abdominal pain  You feel it is time to go to the hospital  If this is your first labor, call when contractions are very intense and have been about every 3-4 minutes for about an hour  If it is your second labor or more, call when contractions are strong and about every 3-5 minutes or sooner depending on your level of discomfort.     Keep in mind we are always here for you! If you have questions, concerns please don't hesitate to call us.     What to eat/drink in labor: Drink plenty of fluid (water most importantly, juice, soda or tea without caffeine). Eat rice, pasta, soup, cereal, bread/toast, and fruit. Avoid dairy and greasy food as they are difficult to digest and you may experience some nausea during labor.    Comfort measures:  Baths and showers (ok even with ruptured membranes, it may temporarily slow contractions if " you are still in the early stage of labor)  Warm/hot packs for back pain or discomfort  Back, belly, or thigh massages  Standing, rocking, walking, leaning over bed or tables, side-lying and sleeping    Miscellaneous:   Contractions are timed from the beginning of one to the beginning of the next  Try hard to sleep during the early stage of labor when you are not that uncomfortable. Timing of contractions at this point is not important  Even if you cannot sleep, resting in bed or on the couch can help you maintain your energy for labor  When you arrive at the hospital the nurse will check your baby's heartbeat, check your cervix, and will call us. The midwife on call will come in and be with you when you are in active labor  From 7am to 11pm, you can park in the East ramp and enter the hospital through Door 6  After hours you need to enter the hospital through the emergency room     COMMON MEDICATIONS AFTER GIVING BIRTH    After giving birth, it is common to be prescribed medications that are available over the counter.  It is recommended that you purchase these medications before you have your baby and have at home.  The common medications are:  Colace 100mg (generic name docusate sodium)  Motrin or Advil 200mg (generic name ibuprofen)  Tylenol 325-500mg (generic name acetaminophen)    If you are unable to find the medications or would prefer to have prescriptions sent to a pharmacy, that can be done when you are being discharged from the hospital.  If you have any questions or concerns, please do not hesitate to contact our clinic: [phone].     Fetal Kick Counts    It is important to know when your baby's movements occur. We often get busy with work and life and do not pay close attention to their movements.      Women typically begin feeling movement between 18-22 weeks of gestation, sometimes it can be earlier or later depending on where your placenta is     Movements usually begin feeling like popping or  "fluttering and as the baby grows they become more pronounced    Toward the end of pregnancy as the baby gets larger they may not move as much or make as big of movements. Babies have maturing sleep cycles as well as not as much room to move and flip. If you are ever concerned about your baby's movements or have not felt the baby move for a while, we recommend you do a fetal kick count. Prior to starting your count drink a glass of water or juice and eat a snack. Then lay down on your side and begin to count movements.     How to do a Fetal Kick Counts    There are many different ways to monitor your baby's movements. Movements can range from large jabs to small kicks, or wiggles.  Hiccups count!    Count 10 movements in 2 hours when resting and focusing  Count 10 movements in 12 hours when doing normal activity    We recommend that if movements occur but seem decreased that you should be seen in the clinic or hospital for evaluation within 12 hours. If fetal movement is absent or fetal kick counts are low please contact us right away.    If you ever have any concerns about your baby's movements DO NOT HESITATE to call us, we are here for you!    [facility]  [phone]       PREECLAMPSIA SIGNS AND SYMPTOMS    Preeclampsia is a dangerous condition that some women develop in the second half of pregnancy. It can also begin after the baby is born.  Preeclampsia causes high blood pressure and can cause problems with many organ systems in your body.  It can also affect the growth of your baby. The exact cause of preeclampsia is unknown, however, there are signs and symptoms to watch for:    -A bad headache that doesn't improve with Tylenol  -Visual changes such as spots, flashes of light, blurry vision  -Pain in the upper right part of your abdomen, especially under the ribs that doesn't go away  -Nausea and/or vomiting  -Feeling extremely tired  -Yellowing of the skin and/or eyes  -Feeling \"not " "quite right\" or that something is wrong  -An extreme amount of swelling (some swelling in pregnancy is very normal)    If your midwife feels that you are developing preeclampsia, you will have lab tests drawn and will be monitored very closely.     If you are experiencing anyof these symptoms, call the [facility] immediately at [phone].     NATURAL LABOR PREPARATION    So, you're getting closer and closer to your due date and wondering what you can do to help get your body ready for labor.   Here are some natural methods you can try:    NOTE: DO NOT begin any of the following prior to 36 completed weeks of pregnancy!    Evening Primrose Oil: Take 1000mg by mouth three times per day. This encourages the cervix to ripen. Cervical ripening is when your cervix becomes more soft and stretchy to get ready for dilation and effacement.     Red Raspberry Leaf Tea: Red raspberry tea (get it at a [employer] or in the grocery store). Drink three cups per day (hot or cold). This can help to prepare the uterine muscles for labor.     "

## 2023-10-02 NOTE — PROGRESS NOTES
39w0d    Feels well overall. Brings questions about the following:  - Lyme disease testing: was bitten by tick in [place], found the tick on Sunday. Small, non-erythematic luis noted to anterior left shoulder. Believes tick was attached for less than 24 hours. Wondering about next steps; plan to follow-up with [employer] message.    Fetal movement: positive    Requests cervical exam today; completed see vitals.     Fetal kick/movement counts reviewed  Labor signs and symptoms discussed, aware of numbers to call  Discussed warning signs, signs and symptoms preeclampsia    Return to clinic 1 week for BPP and CNM visit.    GHULAM Child CNM

## 2023-10-08 NOTE — PROGRESS NOTES
39w6d  Feels well.  Open to encouraging labor with membrane sweep if able.  Fetal movement: positive  Denies vaginal bleeding/lof, a little contractions, denies signs and symptoms preeclampsia    Discussed post dates management, order for BPP between 40-41 weeks, again at 41 and 41+ weeks, discussed recommendation for induction of labor after 41 weeks    Fetal kick/movement counts reviewed  Labor signs and symptoms discussed, aware of numbers to call  Discussed warning signs, signs and symptoms preeclampsia    Membrane sweep done, encouraged calling  when labour support is needed.  Discussed appropriate use of castor oil    Return to clinic 1 week    GHULAM Camarillo CNM

## 2023-10-08 NOTE — PATIENT INSTRUCTIONS
Post Dates Management    If you've gone beyond your due date you are probably thinking when is this baby coming!  Most babies are born on or after their due date so it is nothing to worry about.    If you are pregnant at 41 weeks we will start some increased monitoring to make sure that your baby and the placenta are healthy enough to continue your pregnancy.    We would have you come to the clinic every 3-4 days to be assessed with an ultrasound called a Biophysical profile (BPP).     This tells us how your baby is doing. We monitor fetal movement, breathing patterns, amniotic fluid level, and heart rate.     Research has shown that by 42 weeks gestation the placenta is not always healthy enough to support the pregnancy further and it is better for the baby to be born.  If you are still pregnant by 41 and a half weeks we will discuss induction of labor with you and the different options available.

## 2023-10-09 ENCOUNTER — ANCILLARY PROCEDURE (OUTPATIENT)
Dept: ULTRASOUND IMAGING | Facility: CLINIC | Age: 33
End: 2023-10-09
Attending: ADVANCED PRACTICE MIDWIFE
Payer: COMMERCIAL

## 2023-10-09 ENCOUNTER — PRENATAL OFFICE VISIT (OUTPATIENT)
Dept: MIDWIFE SERVICES | Facility: CLINIC | Age: 33
End: 2023-10-09
Payer: COMMERCIAL

## 2023-10-09 VITALS — DIASTOLIC BLOOD PRESSURE: 64 MMHG | BODY MASS INDEX: 31.78 KG/M2 | WEIGHT: 209 LBS | SYSTOLIC BLOOD PRESSURE: 112 MMHG

## 2023-10-09 DIAGNOSIS — Z34.03 ENCOUNTER FOR SUPERVISION OF NORMAL FIRST PREGNANCY IN THIRD TRIMESTER: Primary | ICD-10-CM

## 2023-10-09 DIAGNOSIS — Z34.03 ENCOUNTER FOR SUPERVISION OF NORMAL FIRST PREGNANCY IN THIRD TRIMESTER: ICD-10-CM

## 2023-10-09 PROCEDURE — 99207 PR PRENATAL VISIT: CPT | Performed by: ADVANCED PRACTICE MIDWIFE

## 2023-10-09 PROCEDURE — 76819 FETAL BIOPHYS PROFIL W/O NST: CPT | Performed by: OBSTETRICS & GYNECOLOGY

## 2023-10-10 ENCOUNTER — ALLIED HEALTH/NURSE VISIT (OUTPATIENT)
Dept: FAMILY MEDICINE | Facility: CLINIC | Age: 33
End: 2023-10-10
Payer: COMMERCIAL

## 2023-10-10 ENCOUNTER — MYC MEDICAL ADVICE (OUTPATIENT)
Dept: MIDWIFE SERVICES | Facility: CLINIC | Age: 33
End: 2023-10-10
Payer: COMMERCIAL

## 2023-10-10 DIAGNOSIS — Z23 HIGH PRIORITY FOR 2019-NCOV VACCINE: Primary | ICD-10-CM

## 2023-10-10 PROCEDURE — 99207 PR NO CHARGE NURSE ONLY: CPT

## 2023-10-10 PROCEDURE — 90480 ADMN SARSCOV2 VAC 1/ONLY CMP: CPT

## 2023-10-10 PROCEDURE — 91320 SARSCV2 VAC 30MCG TRS-SUC IM: CPT

## 2023-10-11 ENCOUNTER — ANESTHESIA EVENT (OUTPATIENT)
Dept: OBGYN | Facility: CLINIC | Age: 33
End: 2023-10-11
Payer: COMMERCIAL

## 2023-10-11 ENCOUNTER — HOSPITAL ENCOUNTER (INPATIENT)
Facility: CLINIC | Age: 33
LOS: 2 days | Discharge: HOME | End: 2023-10-13
Attending: ADVANCED PRACTICE MIDWIFE | Admitting: ADVANCED PRACTICE MIDWIFE
Payer: COMMERCIAL

## 2023-10-11 ENCOUNTER — ANESTHESIA (OUTPATIENT)
Dept: OBGYN | Facility: CLINIC | Age: 33
End: 2023-10-11
Payer: COMMERCIAL

## 2023-10-11 DIAGNOSIS — R52 POSTPARTUM PAIN: ICD-10-CM

## 2023-10-11 DIAGNOSIS — Z78.9 INFANT EXCLUSIVELY BREASTFED: Primary | ICD-10-CM

## 2023-10-11 DIAGNOSIS — K59.01 SLOW TRANSIT CONSTIPATION: ICD-10-CM

## 2023-10-11 PROCEDURE — 250N000011 HC RX IP 250 OP 636: Performed by: ADVANCED PRACTICE MIDWIFE

## 2023-10-11 PROCEDURE — 3E0R3BZ INTRODUCTION OF ANESTHETIC AGENT INTO SPINAL CANAL, PERCUTANEOUS APPROACH: ICD-10-PCS | Performed by: ANESTHESIOLOGY

## 2023-10-11 PROCEDURE — 250N000011 HC RX IP 250 OP 636: Performed by: ANESTHESIOLOGY

## 2023-10-11 PROCEDURE — 250N000011 HC RX IP 250 OP 636: Mod: JZ | Performed by: ANESTHESIOLOGY

## 2023-10-11 PROCEDURE — 00HU33Z INSERTION OF INFUSION DEVICE INTO SPINAL CANAL, PERCUTANEOUS APPROACH: ICD-10-PCS | Performed by: ANESTHESIOLOGY

## 2023-10-11 PROCEDURE — 370N000003 HC ANESTHESIA WARD SERVICE: Performed by: ANESTHESIOLOGY

## 2023-10-11 PROCEDURE — G0463 HOSPITAL OUTPT CLINIC VISIT: HCPCS

## 2023-10-11 PROCEDURE — 258N000003 HC RX IP 258 OP 636: Performed by: ADVANCED PRACTICE MIDWIFE

## 2023-10-11 PROCEDURE — 120N000001 HC R&B MED SURG/OB

## 2023-10-11 RX ORDER — FENTANYL CITRATE 50 UG/ML
100 INJECTION, SOLUTION INTRAMUSCULAR; INTRAVENOUS
Status: DISCONTINUED | OUTPATIENT
Start: 2023-10-11 | End: 2023-10-12 | Stop reason: HOSPADM

## 2023-10-11 RX ORDER — NALOXONE HYDROCHLORIDE 0.4 MG/ML
0.2 INJECTION, SOLUTION INTRAMUSCULAR; INTRAVENOUS; SUBCUTANEOUS
Status: DISCONTINUED | OUTPATIENT
Start: 2023-10-11 | End: 2023-10-12 | Stop reason: HOSPADM

## 2023-10-11 RX ORDER — MISOPROSTOL 200 UG/1
400 TABLET ORAL
Status: DISCONTINUED | OUTPATIENT
Start: 2023-10-11 | End: 2023-10-12 | Stop reason: HOSPADM

## 2023-10-11 RX ORDER — PROCHLORPERAZINE 25 MG
25 SUPPOSITORY, RECTAL RECTAL EVERY 12 HOURS PRN
Status: DISCONTINUED | OUTPATIENT
Start: 2023-10-11 | End: 2023-10-11 | Stop reason: HOSPADM

## 2023-10-11 RX ORDER — KETOROLAC TROMETHAMINE 30 MG/ML
30 INJECTION, SOLUTION INTRAMUSCULAR; INTRAVENOUS
Status: DISCONTINUED | OUTPATIENT
Start: 2023-10-11 | End: 2023-10-12

## 2023-10-11 RX ORDER — NALOXONE HYDROCHLORIDE 0.4 MG/ML
0.4 INJECTION, SOLUTION INTRAMUSCULAR; INTRAVENOUS; SUBCUTANEOUS
Status: DISCONTINUED | OUTPATIENT
Start: 2023-10-11 | End: 2023-10-12 | Stop reason: HOSPADM

## 2023-10-11 RX ORDER — CITRIC ACID/SODIUM CITRATE 334-500MG
30 SOLUTION, ORAL ORAL
Status: DISCONTINUED | OUTPATIENT
Start: 2023-10-11 | End: 2023-10-12 | Stop reason: HOSPADM

## 2023-10-11 RX ORDER — OXYTOCIN/0.9 % SODIUM CHLORIDE 30/500 ML
100-340 PLASTIC BAG, INJECTION (ML) INTRAVENOUS CONTINUOUS PRN
Status: DISCONTINUED | OUTPATIENT
Start: 2023-10-11 | End: 2023-10-12

## 2023-10-11 RX ORDER — ONDANSETRON 2 MG/ML
4 INJECTION INTRAMUSCULAR; INTRAVENOUS EVERY 6 HOURS PRN
Status: DISCONTINUED | OUTPATIENT
Start: 2023-10-11 | End: 2023-10-12 | Stop reason: HOSPADM

## 2023-10-11 RX ORDER — OXYTOCIN 10 [USP'U]/ML
10 INJECTION, SOLUTION INTRAMUSCULAR; INTRAVENOUS
Status: DISCONTINUED | OUTPATIENT
Start: 2023-10-11 | End: 2023-10-12 | Stop reason: HOSPADM

## 2023-10-11 RX ORDER — METOCLOPRAMIDE HYDROCHLORIDE 5 MG/ML
10 INJECTION INTRAMUSCULAR; INTRAVENOUS EVERY 6 HOURS PRN
Status: DISCONTINUED | OUTPATIENT
Start: 2023-10-11 | End: 2023-10-12 | Stop reason: HOSPADM

## 2023-10-11 RX ORDER — METOCLOPRAMIDE 10 MG/1
10 TABLET ORAL EVERY 6 HOURS PRN
Status: DISCONTINUED | OUTPATIENT
Start: 2023-10-11 | End: 2023-10-11 | Stop reason: HOSPADM

## 2023-10-11 RX ORDER — ROPIVACAINE HYDROCHLORIDE 2 MG/ML
INJECTION, SOLUTION EPIDURAL; INFILTRATION; PERINEURAL
Status: COMPLETED | OUTPATIENT
Start: 2023-10-11 | End: 2023-10-11

## 2023-10-11 RX ORDER — TRANEXAMIC ACID 10 MG/ML
1 INJECTION, SOLUTION INTRAVENOUS EVERY 30 MIN PRN
Status: DISCONTINUED | OUTPATIENT
Start: 2023-10-11 | End: 2023-10-12 | Stop reason: HOSPADM

## 2023-10-11 RX ORDER — PROCHLORPERAZINE MALEATE 5 MG
10 TABLET ORAL EVERY 6 HOURS PRN
Status: DISCONTINUED | OUTPATIENT
Start: 2023-10-11 | End: 2023-10-11 | Stop reason: HOSPADM

## 2023-10-11 RX ORDER — ONDANSETRON 2 MG/ML
4 INJECTION INTRAMUSCULAR; INTRAVENOUS EVERY 6 HOURS PRN
Status: DISCONTINUED | OUTPATIENT
Start: 2023-10-11 | End: 2023-10-11 | Stop reason: HOSPADM

## 2023-10-11 RX ORDER — NALBUPHINE HYDROCHLORIDE 20 MG/ML
2.5-5 INJECTION, SOLUTION INTRAMUSCULAR; INTRAVENOUS; SUBCUTANEOUS EVERY 6 HOURS PRN
Status: DISCONTINUED | OUTPATIENT
Start: 2023-10-11 | End: 2023-10-13 | Stop reason: HOSPADM

## 2023-10-11 RX ORDER — METHYLERGONOVINE MALEATE 0.2 MG/ML
200 INJECTION INTRAVENOUS
Status: DISCONTINUED | OUTPATIENT
Start: 2023-10-11 | End: 2023-10-12 | Stop reason: HOSPADM

## 2023-10-11 RX ORDER — ONDANSETRON 4 MG/1
4 TABLET, ORALLY DISINTEGRATING ORAL EVERY 6 HOURS PRN
Status: DISCONTINUED | OUTPATIENT
Start: 2023-10-11 | End: 2023-10-11 | Stop reason: HOSPADM

## 2023-10-11 RX ORDER — CARBOPROST TROMETHAMINE 250 UG/ML
250 INJECTION, SOLUTION INTRAMUSCULAR
Status: DISCONTINUED | OUTPATIENT
Start: 2023-10-11 | End: 2023-10-12 | Stop reason: HOSPADM

## 2023-10-11 RX ORDER — ONDANSETRON 4 MG/1
4 TABLET, ORALLY DISINTEGRATING ORAL EVERY 6 HOURS PRN
Status: DISCONTINUED | OUTPATIENT
Start: 2023-10-11 | End: 2023-10-12 | Stop reason: HOSPADM

## 2023-10-11 RX ORDER — FENTANYL CITRATE-0.9 % NACL/PF 10 MCG/ML
100 PLASTIC BAG, INJECTION (ML) INTRAVENOUS EVERY 5 MIN PRN
Status: DISCONTINUED | OUTPATIENT
Start: 2023-10-11 | End: 2023-10-12 | Stop reason: HOSPADM

## 2023-10-11 RX ORDER — ROPIVACAINE HYDROCHLORIDE 2 MG/ML
10 INJECTION, SOLUTION EPIDURAL; INFILTRATION; PERINEURAL ONCE
Status: DISCONTINUED | OUTPATIENT
Start: 2023-10-11 | End: 2023-10-12 | Stop reason: HOSPADM

## 2023-10-11 RX ORDER — METOCLOPRAMIDE HYDROCHLORIDE 5 MG/ML
10 INJECTION INTRAMUSCULAR; INTRAVENOUS EVERY 6 HOURS PRN
Status: DISCONTINUED | OUTPATIENT
Start: 2023-10-11 | End: 2023-10-11 | Stop reason: HOSPADM

## 2023-10-11 RX ORDER — MISOPROSTOL 200 UG/1
800 TABLET ORAL
Status: DISCONTINUED | OUTPATIENT
Start: 2023-10-11 | End: 2023-10-12 | Stop reason: HOSPADM

## 2023-10-11 RX ORDER — METOCLOPRAMIDE 10 MG/1
10 TABLET ORAL EVERY 6 HOURS PRN
Status: DISCONTINUED | OUTPATIENT
Start: 2023-10-11 | End: 2023-10-12 | Stop reason: HOSPADM

## 2023-10-11 RX ORDER — PROCHLORPERAZINE MALEATE 5 MG
10 TABLET ORAL EVERY 6 HOURS PRN
Status: DISCONTINUED | OUTPATIENT
Start: 2023-10-11 | End: 2023-10-12 | Stop reason: HOSPADM

## 2023-10-11 RX ORDER — DEXTROSE, SODIUM CHLORIDE, SODIUM LACTATE, POTASSIUM CHLORIDE, AND CALCIUM CHLORIDE 5; .6; .31; .03; .02 G/100ML; G/100ML; G/100ML; G/100ML; G/100ML
INJECTION, SOLUTION INTRAVENOUS CONTINUOUS
Status: DISCONTINUED | OUTPATIENT
Start: 2023-10-11 | End: 2023-10-13 | Stop reason: HOSPADM

## 2023-10-11 RX ORDER — PROCHLORPERAZINE 25 MG
25 SUPPOSITORY, RECTAL RECTAL EVERY 12 HOURS PRN
Status: DISCONTINUED | OUTPATIENT
Start: 2023-10-11 | End: 2023-10-12 | Stop reason: HOSPADM

## 2023-10-11 RX ORDER — OXYTOCIN 10 [USP'U]/ML
10 INJECTION, SOLUTION INTRAMUSCULAR; INTRAVENOUS
Status: DISCONTINUED | OUTPATIENT
Start: 2023-10-11 | End: 2023-10-12

## 2023-10-11 RX ORDER — OXYTOCIN/0.9 % SODIUM CHLORIDE 30/500 ML
340 PLASTIC BAG, INJECTION (ML) INTRAVENOUS CONTINUOUS PRN
Status: DISCONTINUED | OUTPATIENT
Start: 2023-10-11 | End: 2023-10-12 | Stop reason: HOSPADM

## 2023-10-11 RX ORDER — IBUPROFEN 400 MG/1
800 TABLET, FILM COATED ORAL
Status: DISCONTINUED | OUTPATIENT
Start: 2023-10-11 | End: 2023-10-12

## 2023-10-11 RX ADMIN — Medication 100 MCG: at 23:55

## 2023-10-11 RX ADMIN — ROPIVACAINE HYDROCHLORIDE 10 ML: 2 INJECTION, SOLUTION EPIDURAL; INFILTRATION at 22:14

## 2023-10-11 RX ADMIN — SODIUM CHLORIDE, SODIUM LACTATE, POTASSIUM CHLORIDE, CALCIUM CHLORIDE AND DEXTROSE MONOHYDRATE: 5; 600; 310; 30; 20 INJECTION, SOLUTION INTRAVENOUS at 21:23

## 2023-10-11 RX ADMIN — Medication: at 22:27

## 2023-10-11 RX ADMIN — SODIUM CHLORIDE, POTASSIUM CHLORIDE, SODIUM LACTATE AND CALCIUM CHLORIDE 1000 ML: 600; 310; 30; 20 INJECTION, SOLUTION INTRAVENOUS at 21:38

## 2023-10-11 ASSESSMENT — ACTIVITIES OF DAILY LIVING (ADL)
ADLS_ACUITY_SCORE: 18

## 2023-10-11 ASSESSMENT — LIFESTYLE VARIABLES: TOBACCO_USE: 0

## 2023-10-12 LAB
ABO/RH(D): NORMAL
FETAL BLEED SCREEN: NORMAL
SPECIMEN EXPIRATION DATE: NORMAL

## 2023-10-12 PROCEDURE — 86900 BLOOD TYPING SEROLOGIC ABO: CPT | Performed by: ADVANCED PRACTICE MIDWIFE

## 2023-10-12 PROCEDURE — 36415 COLL VENOUS BLD VENIPUNCTURE: CPT | Performed by: ADVANCED PRACTICE MIDWIFE

## 2023-10-12 PROCEDURE — 120N000012 HC R&B POSTPARTUM

## 2023-10-12 PROCEDURE — 59400 OBSTETRICAL CARE: CPT | Performed by: ADVANCED PRACTICE MIDWIFE

## 2023-10-12 PROCEDURE — 722N000001 HC LABOR CARE VAGINAL DELIVERY SINGLE

## 2023-10-12 PROCEDURE — 0UQGXZZ REPAIR VAGINA, EXTERNAL APPROACH: ICD-10-PCS | Performed by: ADVANCED PRACTICE MIDWIFE

## 2023-10-12 PROCEDURE — 250N000013 HC RX MED GY IP 250 OP 250 PS 637: Performed by: ADVANCED PRACTICE MIDWIFE

## 2023-10-12 PROCEDURE — 250N000011 HC RX IP 250 OP 636: Mod: JZ | Performed by: ADVANCED PRACTICE MIDWIFE

## 2023-10-12 RX ORDER — ACETAMINOPHEN 325 MG/1
650 TABLET ORAL EVERY 4 HOURS PRN
Status: DISCONTINUED | OUTPATIENT
Start: 2023-10-12 | End: 2023-10-13 | Stop reason: HOSPADM

## 2023-10-12 RX ORDER — HYDROCORTISONE 25 MG/G
CREAM TOPICAL 3 TIMES DAILY PRN
Status: DISCONTINUED | OUTPATIENT
Start: 2023-10-12 | End: 2023-10-13 | Stop reason: HOSPADM

## 2023-10-12 RX ORDER — CARBOPROST TROMETHAMINE 250 UG/ML
250 INJECTION, SOLUTION INTRAMUSCULAR
Status: DISCONTINUED | OUTPATIENT
Start: 2023-10-12 | End: 2023-10-13 | Stop reason: HOSPADM

## 2023-10-12 RX ORDER — NALOXONE HYDROCHLORIDE 0.4 MG/ML
0.2 INJECTION, SOLUTION INTRAMUSCULAR; INTRAVENOUS; SUBCUTANEOUS
Status: DISCONTINUED | OUTPATIENT
Start: 2023-10-12 | End: 2023-10-13 | Stop reason: HOSPADM

## 2023-10-12 RX ORDER — MODIFIED LANOLIN
OINTMENT (GRAM) TOPICAL
Status: DISCONTINUED | OUTPATIENT
Start: 2023-10-12 | End: 2023-10-13 | Stop reason: HOSPADM

## 2023-10-12 RX ORDER — METHYLERGONOVINE MALEATE 0.2 MG/ML
200 INJECTION INTRAVENOUS
Status: DISCONTINUED | OUTPATIENT
Start: 2023-10-12 | End: 2023-10-13 | Stop reason: HOSPADM

## 2023-10-12 RX ORDER — OXYTOCIN/0.9 % SODIUM CHLORIDE 30/500 ML
340 PLASTIC BAG, INJECTION (ML) INTRAVENOUS CONTINUOUS PRN
Status: DISCONTINUED | OUTPATIENT
Start: 2023-10-12 | End: 2023-10-13 | Stop reason: HOSPADM

## 2023-10-12 RX ORDER — MISOPROSTOL 200 UG/1
800 TABLET ORAL
Status: DISCONTINUED | OUTPATIENT
Start: 2023-10-12 | End: 2023-10-13 | Stop reason: HOSPADM

## 2023-10-12 RX ORDER — DOCUSATE SODIUM 100 MG/1
100 CAPSULE, LIQUID FILLED ORAL DAILY
Status: DISCONTINUED | OUTPATIENT
Start: 2023-10-12 | End: 2023-10-13 | Stop reason: HOSPADM

## 2023-10-12 RX ORDER — NALOXONE HYDROCHLORIDE 0.4 MG/ML
0.4 INJECTION, SOLUTION INTRAMUSCULAR; INTRAVENOUS; SUBCUTANEOUS
Status: DISCONTINUED | OUTPATIENT
Start: 2023-10-12 | End: 2023-10-13 | Stop reason: HOSPADM

## 2023-10-12 RX ORDER — BISACODYL 10 MG
10 SUPPOSITORY, RECTAL RECTAL DAILY PRN
Status: DISCONTINUED | OUTPATIENT
Start: 2023-10-12 | End: 2023-10-13 | Stop reason: HOSPADM

## 2023-10-12 RX ORDER — OXYCODONE HYDROCHLORIDE 5 MG/1
5 TABLET ORAL EVERY 4 HOURS PRN
Status: DISCONTINUED | OUTPATIENT
Start: 2023-10-12 | End: 2023-10-13 | Stop reason: HOSPADM

## 2023-10-12 RX ORDER — MISOPROSTOL 200 UG/1
400 TABLET ORAL
Status: DISCONTINUED | OUTPATIENT
Start: 2023-10-12 | End: 2023-10-13 | Stop reason: HOSPADM

## 2023-10-12 RX ORDER — IBUPROFEN 400 MG/1
800 TABLET, FILM COATED ORAL EVERY 6 HOURS PRN
Status: DISCONTINUED | OUTPATIENT
Start: 2023-10-12 | End: 2023-10-13 | Stop reason: HOSPADM

## 2023-10-12 RX ORDER — TRANEXAMIC ACID 10 MG/ML
1 INJECTION, SOLUTION INTRAVENOUS EVERY 30 MIN PRN
Status: DISCONTINUED | OUTPATIENT
Start: 2023-10-12 | End: 2023-10-13 | Stop reason: HOSPADM

## 2023-10-12 RX ORDER — OXYTOCIN 10 [USP'U]/ML
10 INJECTION, SOLUTION INTRAMUSCULAR; INTRAVENOUS
Status: DISCONTINUED | OUTPATIENT
Start: 2023-10-12 | End: 2023-10-13 | Stop reason: HOSPADM

## 2023-10-12 RX ADMIN — IBUPROFEN 800 MG: 400 TABLET ORAL at 19:47

## 2023-10-12 RX ADMIN — HUMAN RHO(D) IMMUNE GLOBULIN 300 MCG: 1500 SOLUTION INTRAMUSCULAR; INTRAVENOUS at 10:12

## 2023-10-12 RX ADMIN — ACETAMINOPHEN 650 MG: 325 TABLET, FILM COATED ORAL at 13:20

## 2023-10-12 RX ADMIN — IBUPROFEN 800 MG: 400 TABLET ORAL at 14:02

## 2023-10-12 RX ADMIN — IBUPROFEN 800 MG: 400 TABLET ORAL at 02:08

## 2023-10-12 RX ADMIN — IBUPROFEN 800 MG: 400 TABLET ORAL at 07:45

## 2023-10-12 ASSESSMENT — ACTIVITIES OF DAILY LIVING (ADL)
ADLS_ACUITY_SCORE: 18

## 2023-10-12 NOTE — PROGRESS NOTES
CNM PROGRESS NOTE    SUBJECTIVE:  Meredith is feeling much better since epidural placement but feeling a lot of rectal pressure. Prior to requesting epidural felt like she was not getting a break from contractions. Tried laboring upright, on toilet, and nitrous and then elected to get an epidural.    OBJECTIVE:  /60   Temp 97.6  F (36.4  C) (Temporal)   Resp 16   LMP 2023   SpO2 99%     Fetal heart tones: Baseline 150   Variability: moderate  Accelerations: absent  Decelerations: present; early, occasional late decelerations    Contractions: Pt is stu every 1-3 minutes, lasting  seconds and palpates strong    Cervix: 10/ 100% / 0, Vtx, BBOW  ROM: Intact      ASSESSMENT:  32 year old  with morgan IUP 40w2d in early labor  EFM: Category 2  GBS negative and membranes intact  RH negative  Epidural anesthesia    PLAN:   Encourage descent through position changes/peanut ball  Encouraged rest  Deleon catheter  Consider AROM if no urge to push in 2 hrs or can start active pushing with intact bag of water  comfort measures prn  reevaluate in 2 hours  Anticipate     Beryl Lopez, APRN CNM

## 2023-10-12 NOTE — H&P
[facility] Labor Admission History & Physical    Meredith Dean is a 32 year old  with an IUP at 40w2d  ; ,   Partner/support Person: Abhinav, , carrol [place]  Language Barrier: English  [facility]  Provider: CNM's    Meredith Dean is admitted to the Birth[facility] at [facility] on 10/11/2023 at 8:38 PM       History of present inllness/Chief Complaint:  Meredith [place] [facility] [place] around , she was found to be 3 cm had previously been 1 cm in clinic on Monday, she is breathing through contractions. After going walking and using the birth ball, as well as laboring on the toilet she was then found to be 4-5/90/-1 and admitted to labor. Birth plan reviewed, patient desires minimal intervention, expecting a boy. Hoping for waterbirth.  Here with: spontaneous onset of labor  Patient reports contractions are Regular           Baby active Yes  Membranes are bulging.  Bloody show No   Any changes with medical history since last prenatal visit No    Obstetrical history  Estimated Date of Delivery: Oct 9, 2023 determined by LMP  Patient's last menstrual period was 2023.   Dating U/S: 23    Fetal anatomic survey: Normal  Placenta: Anterior    PRENATAL COURSE  Prenatal care began at 8 wks gestation for a total of 15 prenatal visits.  Total wt gain 31; There is no height or weight on file to calculate BMI.  Prenatal Blood Pressure: WNL  Prenatal course was complicated by RH Negative/Migraine  Tdap:   Rhogam: 23    Patient Active Problem List   Diagnosis    Supervision of normal first pregnancy    Dupuytren's contracture of left hand    Intractable migraine with aura without status migrainosus    Indication for care in labor or delivery       HISTORY  No Known Allergies  Past Medical History:   Diagnosis Date    Dupuytren contracture     Migraine      Past Surgical History:   Procedure Laterality Date    FINGER SURGERY Left     Fasciectomy x  2    LABIALPLASTY      VULVA SURGERY      wisdom teeth       Family History   Problem Relation Age of Onset    Other - See Comments Father     Cancer Father     Pulmonary fibrosis Maternal Grandmother     Diabetes Maternal Grandfather     Stomach Cancer Maternal Grandfather     Leukemia Maternal Grandfather     Multiple myeloma Maternal Grandfather     Dementia Paternal Grandmother     Hypertension Paternal Grandmother     Cerebrovascular Disease Paternal Grandmother     Heart Disease Paternal Grandfather     Myocardial Infarction Paternal Grandfather      Social History     Tobacco Use    Smoking status: Never    Smokeless tobacco: Never   Substance Use Topics    Alcohol use: Not Currently     OB History    Para Term  AB Living   1 0 0 0 0 0   SAB IAB Ectopic Multiple Live Births   0 0 0 0 0      # Outcome Date GA Lbr Jd/2nd Weight Sex Delivery Anes PTL Lv   1 Current                LABS:  Lab Results   Component Value Date    AS Negative 2023    HGB 12.5 2023    HEPBANG Nonreactive 2023    CHPCRT Negative 2023    GCPCRT Negative 2023       GBS Status: Neg  Rubella: Immune   HIV: Non-Reactive   Platelets:  372  1hr GCT:  84    ROS   Pt is alert and oriented  Pt denies significant constitutional symptoms including fever and/or malaise.    Pt denies significant respiratory, cardiovacular, GI, or muscular/skeletal complaints.    Neuro: Denies HA and visual changes  Muscoloskeletal: Denies except for discomforts r/t pregnancy     PHYSICAL EXAM:  /72   Temp 97.8  F (36.6  C)   LMP 2023   General appearance:  healthy, alert, active, and breathing through contractions   Heart: RRR  Lungs: CTA bilaterally, normal respiratory effort  Abdomen: gravid, single vertex fetus, non-tender, EFW 7.5-8 lbs.   Legs: neg edema     Contractions: Pt is stu every 1-3 minutes    Fetal heart tones: Baseline 140   Variability: moderate   Accelerations: present  Decelerations:  absent    EFM: Category in MAC    Cervix: 4.5/ 90% / Mid/ soft/ -1, Vtx  Bloody show: no  Membranes:  BBOW    ASSESSMENT:  32 year old  with morgan IUP 40w2d in early labor  EFM: Category 1  GBS negative and membranes intact  RH negative    PLAN:  Routine CNM care  Labs ordered: CBC, syphilis screening, and type and screen  Teaching done r/t comfort measures, pain management options, and labor processes  Admit - see IP orders  Intermittent monitoring  Aromatherapy  Hydrotherapy, potential waterbirth, will inflate and fill tub when patient is in active labor  Pain medication as requested  Code status discussed, desire Full Code: YES     GHULAM Adkins CNM

## 2023-10-12 NOTE — LACTATION NOTE
Initial visit with EBER Harper  and baby boy.  Baby latched on in cradle hold and Feels sore , biting.  Baby placed in sitting up facing mother hold on the right breast and  Mother states feels a bit better and as the nursing session continues feels stinging.  Shield placed and baby able to latch on well to the right breast with shield Baby able to latch on well and perform the nutritive suckling pattern noted.  Feels better with the shield.    Breastfeeding general information reviewed.   Advised to breastfeed exclusively, on demand, avoid pacifiers, bottles and formula unless medically indicated.  Encouraged rooming in, skin to skin, feeding on demand 8-12x/day or sooner if baby cues.  Explained benefits of holding and skin to skin.  Encouraged lots of skin to skin. Instructed on hand expression. Outpatient resources reviewed and has abreast pump for home.    Continues to nurse well per mom. No further questions at this time.   Will follow as needed.   Marge ELIASN, RN, PHN, RNC-MNN, IBCLC

## 2023-10-12 NOTE — ANESTHESIA PREPROCEDURE EVALUATION
"Anesthesia Pre-Procedure Evaluation    Patient: Meredith Dean   MRN: [id number] : 1990        Procedure :           Past Medical History:   Diagnosis Date    Dupuytren contracture     Migraine       Past Surgical History:   Procedure Laterality Date    FINGER SURGERY Left     Fasciectomy x 2    LABIALPLASTY      VULVA SURGERY      wisdom teeth        No Known Allergies   Social History     Tobacco Use    Smoking status: Never    Smokeless tobacco: Never   Substance Use Topics    Alcohol use: Not Currently      Wt Readings from Last 1 Encounters:   10/09/23 94.8 kg (209 lb)        Anesthesia Evaluation   Pt has had prior anesthetic.     No history of anesthetic complications       ROS/MED HX  ENT/Pulmonary:    (-) tobacco use and asthma   Neurologic:       Cardiovascular:    (-) PIH   METS/Exercise Tolerance:     Hematologic:     (+)    no thrombocytopenia,         (-) anemia   Musculoskeletal:       GI/Hepatic:     (+) GERD,                   Renal/Genitourinary:       Endo:     (+)               Obesity (BMI 32),       Psychiatric/Substance Use:       Infectious Disease:       Malignancy:       Other:            Physical Exam    Airway        Mallampati: II   TM distance: > 3 FB   Neck ROM: full   Mouth opening: > 3 cm    Respiratory Devices and Support         Dental  no notable dental history         Cardiovascular   cardiovascular exam normal          Pulmonary   pulmonary exam normal                OUTSIDE LABS:  CBC:   Lab Results   Component Value Date    WBC 16.1 (H) 2023    WBC 15.4 (H) 2023    HGB 12.5 2023    HGB 11.3 (L) 2023    HCT 37.2 2023    HCT 34.3 (L) 2023     2023     2023     BMP: No results found for: \"NA\", \"POTASSIUM\", \"CHLORIDE\", \"CO2\", \"BUN\", \"CR\", \"GLC\"  COAGS: No results found for: \"PTT\", \"INR\", \"FIBR\"  POC: No results found for: \"BGM\", \"HCG\", \"HCGS\"  HEPATIC: No results found for: \"ALBUMIN\", \"PROTTOTAL\", \"ALT\", \"AST\", " "\"GGT\", \"ALKPHOS\", \"BILITOTAL\", \"BILIDIRECT\", \"RICHARD\"  OTHER: No results found for: \"PH\", \"LACT\", \"A1C\", \"SHERLYN\", \"PHOS\", \"MAG\", \"LIPASE\", \"AMYLASE\", \"TSH\", \"T4\", \"T3\", \"CRP\", \"SED\"    Anesthesia Plan    ASA Status:  2       Anesthesia Type: Epidural.              Consents    Anesthesia Plan(s) and associated risks, benefits, and realistic alternatives discussed. Questions answered and patient/representative(s) expressed understanding.     - Discussed:     - Discussed with:  Patient            Postoperative Care            Comments:                Kasey Bautista MD  "

## 2023-10-12 NOTE — ANESTHESIA POSTPROCEDURE EVALUATION
Patient: Meredith Dean    Procedure: * No procedures listed *       Anesthesia Type:  General    Note:  Disposition: Admission   Postop Pain Control: Uneventful            Sign Out: Well controlled pain   PONV: No   Neuro/Psych: Uneventful            Sign Out: Acceptable/Baseline neuro status   Airway/Respiratory: Uneventful            Sign Out: Acceptable/Baseline resp. status   CV/Hemodynamics: Uneventful            Sign Out: Acceptable CV status; No obvious hypovolemia; No obvious fluid overload   Other NRE: NONE   DID A NON-ROUTINE EVENT OCCUR? No    Event details/Postop Comments:  Patient doing well. No headaches, low back pain or residual numbness/paresthesias. Eating and drinking without difficulty. All questions answered. No concerns from patient. No anesthetic complications.              Last vitals:  Vitals:    10/12/23 0639 10/12/23 0810 10/12/23 1224   BP: 119/67 124/74 120/70   Pulse:  54 60   Resp: 16 16 16   Temp:  36.9  C (98.5  F) 36.7  C (98.1  F)   SpO2:          Electronically Signed By: Carline Patel MD  October 12, 2023  5:09 PM

## 2023-10-12 NOTE — PLAN OF CARE
Baby boy born via vaginal delivery at 0019. SROM 0005, mec fluid. Started pushing at 0013. Skin to skin after delivery, breastfeeding well. FOB Abhinav supportive at bedside.

## 2023-10-12 NOTE — PLAN OF CARE
Vitally stable. Breastfeeding well. Has own colostrum in fridge as well. Heavy right leg. Able to walk with support from partner. Voided x2 on PP. BM x3 since birth. Prefers independence in room. Bonding well with baby. Supportive partner.

## 2023-10-12 NOTE — ANESTHESIA PROCEDURE NOTES
"Epidural catheter Procedure Note    Pre-Procedure   Staff -        Anesthesiologist:  Kasey Bautista MD       Performed By: anesthesiologist       Location: OB       Pre-Anesthestic Checklist: patient identified, IV checked, risks and benefits discussed, informed consent, monitors and equipment checked, pre-op evaluation, at physician/surgeon's request and post-op pain management  Timeout:       Correct Patient: Yes        Correct Procedure: Yes        Correct Site: Yes        Correct Position: Yes   Procedure Documentation  Procedure: epidural catheter       Diagnosis: Labor       Patient Position: sitting       Patient Prep/Sterile Barriers: sterile gloves, mask, patient draped       Skin prep: Chloraprep       Local skin infiltrated with 3 mL of 1% lidocaine.        Insertion Site: L2-3. (midline approach).       Technique: LORT saline and LORT air        PATRICK at 5.5 cm.       Needle Type: [employer] needle       Needle Gauge: 17.        Needle Length (Inches): 3.5           Catheter threaded easily.           Threaded 12 cm at skin.         # of attempts: 1 and  # of redirects:  1    Assessment/Narrative         Paresthesias: No.       Test dose of 5 mL lidocaine 1.5% w/ 1:200,000 epinephrine at.         Test dose negative, 3 minutes after injection, for signs of intravascular, subdural, or intrathecal injection.       Insertion/Infusion Method: LORT saline and LORT air       Aspiration negative for Heme or CSF via Epidural Catheter.    Medication(s) Administered   0.2% Ropivacaine (Epidural) - EPIDURAL   10 mL - 10/11/2023 10:14:00 PM    FOR [facility] ([facility] [place], [place]) ONLY:   Pain Team Contact information: please page the Pain Team Via [employer]. Search \"Pain\". During daytime hours, please page the attending first. At night please page the resident first.      "

## 2023-10-12 NOTE — L&D DELIVERY NOTE
Delivery Summary    Meredith Dean MRN# [id number]   Age: 32 year old YOB: 1990     Called to room to evaluate visible bag of water with meconium fluid. Patient denies urge to push but some intermittent pressure. Spontaneous rupture occurred during discussion for thin meconium. SVE 10/+2 and decision made to begin active pushing, excellent maternal effort and baby brought to crown after 2 contractions, NICU team called for delivery.    Delivery Note  IUP at 40 weeks gestation delivered on 10/12/2023.     delivery of a viable male infant, weight pending at time of note.  Apgars of 7 at 1 minute and 9 at 5 minutes.  Labor was spontaneous.  Active pushing time:  00 hours 06 minutes.  Medications administered  in labor:  Pain Rx Epidural and nitrous; Antibiotics No;   Perineum: Vaginal / Sulcus-small, left side, shallow vaginal laceration repaired due to bleeding  delayed cord clamping Yes x 3 minutes, cord clamped by CNM and cut by fob after cessation of pulsation and baby placed skin to skin with mom.  Placenta-mechanism: spontaneous, intact,  with a 3 vessel cord. Patient declined IV/IM pitocin. Will monitor bleeding closely. Patient plans to take possession of placenta and encapsulate.  QBL:  115.5 ml  Complications of pregnancy, labor and delivery: recurrent decelerations immediately prior to imminent deliver  Birth attendants: GHULAM Segura, INGA      ASSESSMENT & PLAN:     Lactating mother  RH negative       Ej Dean-Meredith [[id number]]      Labor Event Times      Latent labor onset date/time: 10/11/2023 1835    Active labor onset date: 10/11/23 Onset time: 10:00 PM   Dilation complete date: 10/11/23 Complete time: 11:05 PM   Start pushing date/time: 10/12/2023 0013          Labor Events     labor?: No   steroids: None  Labor Type: Spontaneous  Predominate monitoring during 1st stage: continuous electronic fetal monitoring, intermittent auscultation       Rupture  date/time:     Rupture type: Spontaneous Rupture of Membranes  Fluid color: Meconium  Fluid odor: Normal     Augmentation: None       Delivery/Placenta Date and Time      Delivery Date: 10/12/23 Delivery Time: 12:19 AM   Placenta Date/Time: 10/12/2023 12:23 AM  Delivering clinician: Beryl Lopez APRN CNM   Other personnel present at delivery:  Provider Role   Nevaeh Owens, RN    Isaura Aguirre, RN              Vaginal Counts       Initial count performed by 2 team members:  Two Team Members   Nevaeh Lopez         [employer] Suture Needles Sponges (RETIRED) Instruments   Initial counts 2 0 5    Added to count  1     Relief counts       Final counts 2 1 5            Placed during labor Accounted for at the end of labor   FSE No No   IUPC No No   Cervidil No No                  Final count performed by 2 team members:  Two Team Members   Roseann Owens      Final count correct?: Yes      Pre-Birth Team Brief: Complete  Post-Birth Team Debrief: Complete       Apgars    Living status: Living   1 Minute 5 Minute 10 Minute 15 Minute 20 Minute   Skin color: 1        Heart rate: 1        Reflex irritability: 2        Muscle tone: 2        Respiratory effort: 1        Total: 7               Cord      Vessels: 3 Vessels    Cord Complications: None               Cord Blood Disposition: Lab    Gases Sent?: No    Delayed cord clamping?: Yes    Cord Clamping Delay (seconds): >120 seconds                       [place]ishop Resuscitation    Methods: None              Skin to Skin and Feeding Plan      Skin to skin initiation date/time: 1/10/1841    Skin to skin with: Mother  Skin to skin end date/time:            Labor Events and Shoulder Dystocia    Fetal Tracing Prior to Delivery: Category 2  Fetal Tracing Comments: recurrent decelerations in second stage  Shoulder dystocia present?: Neg                 Delivery (Maternal) (Provider to Complete) ([id number])    Episiotomy: None      Perineal lacerations:  None      Vaginal laceration?: Yes Repaired?: Yes   Repair suture: 3-0 Vicryl  Number of repair packets: 1  Genital tract inspection done: Pos       Blood Loss  Mother: Meredith Dean #[id number]     Start of Mother's Information      Delivery Blood Loss  10/11/23 2200 - 10/12/23 0111      Delivery QBL (mL) Hospital Encounter 115.5 mL    Total  115.5 mL               End of Mother's Information  Mother: Meredith Dean #[id number]                Delivery - Provider to Complete ([id number])    Delivering clinician: Beryl Lopez APRN CNM  Delivery Type (Choose the 1 that will go to the Birth History): Vaginal, Spontaneous                         Other personnel:  Provider Role   Nevaeh Owens, MALICK    Aguirre, Isaura MOELLER, RN                     Placenta    Date/Time: 10/12/2023 12:23 AM  Removal: Spontaneous  Comments: extra lobe  Disposition: Patient possesion             Anesthesia    Method: Nitrous Oxide, Epidural                    Presentation and Position    Presentation: Vertex    Position: Right Occiput Anterior                     GHULAM Adkins CNM

## 2023-10-12 NOTE — PLAN OF CARE
Data: Patient presented to [facility] at .   Reason for maternal/fetal assessment per patient is labor assessment  Patient is a . Prenatal record reviewed.    Gestational Age 40+2wks. VSS. Fetal movement present. Patient denies backache, vaginal discharge, pelvic pressure, UTI symptoms, GI problems, bloody show, vaginal bleeding, edema, headache, visual disturbances, epigastric or URQ pain, rupture of membranes. Support persons  Abhinav present.  Cervix previously on Monday in clinic was 1cm.  Action: Verbal consent for EFM. Triage assessment completed. EFM applied for fetal well being with labor. Uterine assessment frequent regular moderate ctxs. Fetal assessment: Presumed adequate fetal oxygenation documented (see flow record).   FRED GRANADOSM at bedside plan to recheck cervix at . Patient verbalized agreement with plan. Per John pt ok to be intermittently monitored.  Report to KIKE Escobar RN

## 2023-10-12 NOTE — PLAN OF CARE
Problem: Postpartum (Vaginal Delivery)  Goal: Successful Parent Role Transition  Outcome: Progressing   Goal Outcome Evaluation:         Patient handles infant nicely. She is independent with cares. She is voiding. Breast feeding is going well.

## 2023-10-13 VITALS
OXYGEN SATURATION: 97 % | SYSTOLIC BLOOD PRESSURE: 103 MMHG | TEMPERATURE: 98.8 F | DIASTOLIC BLOOD PRESSURE: 63 MMHG | WEIGHT: 198 LBS | BODY MASS INDEX: 30.11 KG/M2 | HEART RATE: 55 BPM | RESPIRATION RATE: 16 BRPM

## 2023-10-13 PROCEDURE — 250N000013 HC RX MED GY IP 250 OP 250 PS 637: Performed by: ADVANCED PRACTICE MIDWIFE

## 2023-10-13 RX ORDER — HYDROCORTISONE 25 MG/G
CREAM TOPICAL 3 TIMES DAILY PRN
COMMUNITY
Start: 2023-10-13 | End: 2023-10-26

## 2023-10-13 RX ORDER — MODIFIED LANOLIN
OINTMENT (GRAM) TOPICAL
COMMUNITY
Start: 2023-10-13 | End: 2023-11-21

## 2023-10-13 RX ORDER — DOCUSATE SODIUM 100 MG/1
100 CAPSULE, LIQUID FILLED ORAL DAILY
COMMUNITY
Start: 2023-10-14 | End: 2023-10-26

## 2023-10-13 RX ORDER — ACETAMINOPHEN 325 MG/1
650 TABLET ORAL EVERY 4 HOURS PRN
COMMUNITY
Start: 2023-10-13 | End: 2023-10-26

## 2023-10-13 RX ORDER — IBUPROFEN 800 MG/1
800 TABLET, FILM COATED ORAL EVERY 6 HOURS PRN
COMMUNITY
Start: 2023-10-13 | End: 2023-10-26

## 2023-10-13 RX ADMIN — IBUPROFEN 800 MG: 400 TABLET ORAL at 07:25

## 2023-10-13 ASSESSMENT — ACTIVITIES OF DAILY LIVING (ADL)
ADLS_ACUITY_SCORE: 18

## 2023-10-13 NOTE — PLAN OF CARE
Vital signs stable. Postpartum assessment WDL. Pain controlled without need for medication overnight, ice packs and tucks pads to perineum. Patient ambulating independently, free of dizziness or lightheadedness. Patient voiding without difficulty. Breastfeeding on cue with minimal assist, using nipple shield PRN due to infant obtaining painful/tight latch at times per patient's report. Patient and infant bonding well. Will continue with current plan of care.     Goal Outcome Evaluation:      Plan of Care Reviewed With: patient, spouse    Overall Patient Progress: improving

## 2023-10-13 NOTE — PROGRESS NOTES
[facility] CNM Progress Note: Postpartum Day #1  CNM Postpartum Discharge Note    2023  9:30 AM      SIGNIFICANT PROBLEMS:  Patient Active Problem List    Diagnosis Date Noted     (normal spontaneous vaginal delivery) 10/12/2023     Priority: Medium    Dupuytren's contracture of left hand 2023     Priority: Medium    Supervision of normal first pregnancy 2023     Priority: Medium     FOB: Abhinav     JUDY: Oct 9, 2023  O NEG/ ant/ boy  NIPS neg   Tdap:          Flu:           GBS:     RHOGAM:   FA PA [place]  Rubella NI      Intractable migraine with aura without status migrainosus 10/20/2021     Priority: Medium         SUBJECTIVE:  Patient is stable and is tolerating acitivity well  Baby is rooming in  Complications since 2 hours post delivery: None  Pain is well controlled.  Patient is taking pain medications, Ibuprofen  Breastfeeding status:initiated, reports breastfeeding hurts. Has seen LC  Elimination:  She is voiding without difficulty.  She has had a bowel movement  Desired contraception IUD at 6 week PPV  Denies heavy bleeding and passing large clots.  Feels good about birth experience. Pushed 6 min    INTERVAL HISTORY:  /63 (BP Location: Right arm, Patient Position: Semi-Benavidez's)   Pulse 55   Temp 98.8  F (37.1  C) (Oral)   Resp 16   LMP 2023   SpO2 97%   Breastfeeding Yes    Constitutional: healthy, alert, no distress, cooperative, and smiling    Breasts: Currently breastfeeding    Fundus: Uterine fundus is firm, non-tender and at 1 below the level of the umbilicus    Perineum: Perineum is intact and/or well approximated, minimal swelling    Lochia: Lochia is appropriate for the duration of time since delivery.     Postpartum hemoglobin   Hemoglobin   Date Value Ref Range Status   2023 12.5 11.7 - 15.7 g/dL Final     Blood type O negative  Rubella status: Non Immune  History of depression: No    ASSESSMENT/PLAN:    PPD #1  , infant  boy  Doing well  Normal healing wound  No excessive bleeding  Pain well-controlled    Normal postpartum course  Stable Post-partum day #1  Complications:none  Hemoglobin level prior to discharge.   Rhogam prior to discharge, if indicated  Postpartum warning s/s reviewed, including bleeding/clots, fever, mastitis & thromboemboli   Exercise, diet and rest reviewed  Continue prenatal vitamins while breastfeeding  Birthcontrol planned:IUD at 6 weeks PP  Educated on postpartum blues and postpartum depression warnings signs/symptoms  Follow-up in 2&6 weeks with CNMs at [facility] clinic  Plan d/c home today      Current Discharge Medication List        CONTINUE these medications which have NOT CHANGED    Details   ferrous fumarate-vitamin C ER (ATIF-SEQUELS) 65-25 MG CR tablet Take 1 tablet by mouth daily (with breakfast)    Associated Diagnoses: Encounter for supervision of normal first pregnancy in third trimester      fexofenadine (ALLEGRA) 180 MG tablet Take 180 mg by mouth daily      fish oil-omega-3 fatty acids 1000 MG capsule Take 2 g by mouth daily      omeprazole (PRILOSEC) 20 MG DR capsule       omeprazole 20 MG tablet Take 1 tablet (20 mg) by mouth daily  Qty: 30 tablet, Refills: 0    Associated Diagnoses: Heartburn; Encounter for supervision of normal first pregnancy in third trimester      Prenat Vit-FePoly-Methylfol-FA (PNV-IRON PO)       vitamin B complex with vitamin C (STRESS TAB) tablet Take 1 tablet by mouth daily      Vitamin D (Cholecalciferol) 25 MCG (1000 UT) CAPS Take 1,000 Int'l Units by mouth             GHULAM Melendez CNM

## 2023-10-13 NOTE — PLAN OF CARE
"  Problem: Adult Inpatient Plan of Care  Goal: Plan of Care Review  Description: The Plan of Care Review/Shift note should be completed every shift.  The Outcome Evaluation is a brief statement about your assessment that the patient is improving, declining, or no change.  This information will be displayed automatically on your shift  note.  Outcome: Met  Goal: Patient-Specific Goal (Individualized)  Description: You can add care plan individualizations to a care plan. Examples of Individualization might be:  \"Parent requests to be called daily at 9am for status\", \"I have a hard time hearing out of my right ear\", or \"Do not touch me to wake me up as it startles  me\".  Outcome: Met  Goal: Absence of Hospital-Acquired Illness or Injury  Outcome: Met  Intervention: Prevent Skin Injury  Recent Flowsheet Documentation  Taken 10/13/2023 0808 by Melissa Altamirano RN  Body Position: position changed independently  Intervention: Prevent Infection  Recent Flowsheet Documentation  Taken 10/13/2023 0808 by Melissa Altamirano RN  Infection Prevention:   hand hygiene promoted   rest/sleep promoted   cohorting utilized   equipment surfaces disinfected  Goal: Optimal Comfort and Wellbeing  Outcome: Met  Intervention: Provide Person-Centered Care  Recent Flowsheet Documentation  Taken 10/13/2023 0808 by Melissa Altamirano RN  Trust Relationship/Rapport:   care explained   choices provided   emotional support provided   empathic listening provided   questions answered   reassurance provided   questions encouraged   thoughts/feelings acknowledged  Goal: Readiness for Transition of Care  Outcome: Met     Problem: Postpartum (Vaginal Delivery)  Goal: Successful Parent Role Transition  Outcome: Met  Goal: Hemostasis  Outcome: Met  Goal: Absence of Infection Signs and Symptoms  Outcome: Met  Goal: Anesthesia/Sedation Recovery  Outcome: Met  Goal: Optimal Pain Control and Function  Outcome: Met  Goal: Effective Urinary Elimination  Outcome: Met   "   Problem: Breastfeeding  Goal: Effective Breastfeeding  Outcome: Met   Goal Outcome Evaluation:  Patient discharged to home with infant. Care plan completed.

## 2023-10-13 NOTE — DISCHARGE SUMMARY
[facility]    Discharge Summary  Obstetrics    Date of Admission:  10/11/2023  Date of Discharge:  10/13/2023  Discharging Provider: GHULAM Melendez CNM  Date of Service (when I saw the patient): 10/13/23    Discharge Diagnoses   ,  on 10/12/23, infant boy  Lactating mother, breastfeeding going okay  Rh negative    History of Present Illness   Meredith Dean is a 32 year old female who presented with spontaneous labor on 10/12    Hospital Course   The patient's hospital course was unremarkable.  She recovered as anticipated and experienced no post-delivery complications.  On discharge, her pain was well controlled, using Ibuprofen. Vaginal bleeding is stable.  Voiding without difficulty.  Ambulating well and tolerating a normal diet.  No fever.  Breastfeeding is going okay. Reports uncomfortable to breastfeed  Infant is stable.  She was discharged on post-partum day #1.    Post-partum hemoglobin:   Hemoglobin   Date Value Ref Range Status   2023 12.5 11.7 - 15.7 g/dL Final     Discharge Disposition   Discharged to home   Condition at discharge: Stable    Primary Care Physician   Dennise Rodriguez    Consultations This Hospital Stay   ANESTHESIOLOGY IP CONSULT  LACTATION IP CONSULT    Discharge Orders      Breast pump - Manual/Electric    Breast Pump Documentation:  Manual/Electric Pump: To support adequate breast milk production and nutrition for infant.     I, the undersigned, certify that the above prescribed supplies are medically necessary for this patient and is both reasonable and necessary in reference to accepted standards of medical and necessary in reference to accepted standards of medical practice in the treatment of this patient's condition and is not prescribed as a convenience.     Discharge Medications   Current Discharge Medication List        START taking these medications    Details   acetaminophen (TYLENOL) 325 MG tablet Take 2 tablets (650 mg) by  mouth every 4 hours as needed for mild pain or fever (greater than or equal to 38  C /100.4  F (oral) or 38.5  C/ 101.4  F (core).)    Associated Diagnoses: Postpartum pain      benzocaine (AMERICAINE) 20 % external aerosol As directed for external vaginal discomfort    Associated Diagnoses: Postpartum pain      docusate sodium (COLACE) 100 MG capsule Take 1 capsule (100 mg) by mouth daily    Associated Diagnoses: Slow transit constipation      hydrocortisone, Perianal, (ANUSOL-HC) 2.5 % cream Place rectally 3 times daily as needed for hemorrhoids    Associated Diagnoses: Hemorrhoids, postpartum      ibuprofen (ADVIL/MOTRIN) 800 MG tablet Take 1 tablet (800 mg) by mouth every 6 hours as needed for other (cramping)    Associated Diagnoses: Postpartum pain      lanolin ointment Apply topically every hour as needed for other (sore nipples)    Associated Diagnoses: Lactating mother           CONTINUE these medications which have NOT CHANGED    Details   ferrous fumarate-vitamin C ER (ATIF-SEQUELS) 65-25 MG CR tablet Take 1 tablet by mouth daily (with breakfast)    Associated Diagnoses: Encounter for supervision of normal first pregnancy in third trimester      fexofenadine (ALLEGRA) 180 MG tablet Take 180 mg by mouth daily      fish oil-omega-3 fatty acids 1000 MG capsule Take 2 g by mouth daily      Prenat Vit-FePoly-Methylfol-FA (PNV-IRON PO)       vitamin B complex with vitamin C (STRESS TAB) tablet Take 1 tablet by mouth daily      Vitamin D (Cholecalciferol) 25 MCG (1000 UT) CAPS Take 1,000 Int'l Units by mouth           STOP taking these medications       omeprazole (PRILOSEC) 20 MG DR capsule Comments:   Reason for Stopping:         omeprazole 20 MG tablet Comments:   Reason for Stopping:             Allergies   No Known Allergies    Damaris PARMAR CNM

## 2023-10-13 NOTE — DISCHARGE INSTRUCTIONS
Postpartum Vaginal Delivery Instructions    Activity     Ask family and friends for help when you need it.  Do not place anything in your vagina for 6 weeks.  You are not restricted on other activities, but take it easy for a few weeks to allow your body to recover from delivery.  You are able to do any activities you feel up to that point.  No driving until you have stopped taking your pain medications (usually two weeks after delivery).     Call your health care provider if you have any of these symptoms:     Increased pain, swelling, redness, or fluid around your stiches from an episiotomy or perineal tear.  A fever above 100.4 F (38 C) with or without chills when placing a thermometer under your tongue.  You soak a sanitary pad with blood within 1 hour, or you see blood clots larger than a golf ball.  Bleeding that lasts more than 6 weeks.  Vaginal discharge that smells bad.  Severe pain, cramping or tenderness in your lower belly area.  A need to urinate more frequently (use the toilet more often), more urgently (use the toilet very quickly), or it burns when you urinate.  Nausea and vomiting.  Redness, swelling or pain around a vein in your leg.  Problems breastfeeding or a red or painful area on your breast.  Chest pain and cough or are gasping for air.  Problems coping with sadness, anxiety, or depression.  If you have any concerns about hurting yourself or the baby, call your provider immediately.   You have questions or concerns after you return home.     Keep your hands clean:  Always wash your hands before touching your perineal area and stitches.  This helps reduce your risk of infection.  If your hands aren't dirty, you may use an alcohol hand-rub to clean your hands. Keep your nails clean and short.    Warning Signs after Having a Baby    Keep this paper on your fridge or somewhere else where you can see it.    Call your provider if you have any of these symptoms up to 12 weeks after having your  baby.    Thoughts of hurting yourself or your baby  Pain in your chest or trouble breathing  Severe headache not helped by pain medicine  Eyesight concerns (blurry vision, seeing spots or flashes of light, other changes to eyesight)  Fainting, shaking or other signs of a seizure    Call 9-1-1 if you feel that it is an emergency.     The symptoms below can happen to anyone after giving birth. They can be very serious. Call your provider if you have any of these warning signs.    My provider s phone number: _______________________    Losing too much blood (hemorrhage)    Call your provider if you soak through a pad in less than an hour or pass blood clots bigger than a golf ball. These may be signs that you are bleeding too much.    Blood clots in the legs or lungs    After you give birth, your body naturally clots its blood to help prevent blood loss. Sometimes this increased clotting can happen in other areas of the body, like the legs or lungs. This can block your blood flow and be very dangerous.     Call your provider if you:  Have a red, swollen spot on the back of your leg that is warm or painful when you touch it.   Are coughing up blood.     Infection    Call your provider if you have any of these symptoms:  Fever of 100.4 F (38 C) or higher.  Pain or redness around your stitches if you had an incision.   Any yellow, white, or green fluid coming from places where you had stitches or surgery.    Mood Problems (postpartum depression)    Many people feel sad or have mood changes after having a baby. But for some people, these mood swings are worse.     Call your provider right away if you feel so anxious or nervous that you can't care for yourself or your baby.    Preeclampsia (high blood pressure)    Even if you didn't have high blood pressure when you were pregnant, you are at risk for the high blood pressure disease called preeclampsia. This risk can last up to 12 weeks after giving birth.     Call your  provider if you have:   Pain on your right side under your rib cage  Sudden swelling in the hands and face    Remember: You know your body. If something doesn't feel right, get medical help.     For informational purposes only. Not to replace the advice of your health care provider. Copyright 2020 [employer]. All rights reserved. Clinically reviewed by Minal Montesinos, RNC-OB, MSN. [employer] 908151 - Rev 02/23.

## 2023-10-16 ENCOUNTER — TELEPHONE (OUTPATIENT)
Dept: PEDIATRICS | Facility: CLINIC | Age: 33
End: 2023-10-16

## 2023-10-16 DIAGNOSIS — O92.29 SORE NIPPLES DUE TO LACTATION: Primary | ICD-10-CM

## 2023-10-16 RX ORDER — MUPIROCIN 20 MG/G
OINTMENT TOPICAL
Qty: 30 G | Refills: 1 | Status: SHIPPED | OUTPATIENT
Start: 2023-10-16 | End: 2023-11-21

## 2023-10-22 NOTE — PROGRESS NOTES
Midwife Postpartum 2 Week Visit    Meredith Dean is a 32 year old      This visit took place over the phone [phone]    Delivery date was 10/12/23. She had a Vaginal, Spontaneous  delivery of a viable boy, 40w3d named Andreea, weight 7 pounds 3 oz., with complications of  None .    Information for the patient's :  Andreea Romeo [[id number]]   7 lbs 2.64 oz    APGARs 7 , 9     Since delivery, she has been breastfeeding and pumping.  She has not had any signs of infection, she describes her lochia after 2 weeks as red blood, spotting and brown discharge.  She has not had other complications.      She is voiding and having bowel movements without difficulty.       Contraception was discussed and patient desires planning on mirena.   She  has not had intercourse since delivery. She complains of No  perineal discomfort.  Hardly noticed any discomfort, feels like its healed.    Mood is Stable    ROS:  12 point review of systems negative other than symptoms noted below or in the HPI.       Current Outpatient Medications:     cetirizine (ZYRTEC) 10 MG tablet, Take 10 mg by mouth daily, Disp: , Rfl:     ferrous fumarate-vitamin C ER (ATIF-SEQUELS) 65-25 MG CR tablet, Take 1 tablet by mouth daily (with breakfast), Disp: , Rfl:     mupirocin (BACTROBAN) 2 % external ointment, Apply to nipples after feedings with plastic wrap.  No need to wash off.  Can wipe off if slippery., Disp: 30 g, Rfl: 1    Prenat Vit-FePoly-Methylfol-FA (PNV-IRON PO), , Disp: , Rfl:     vitamin B complex with vitamin C (STRESS TAB) tablet, Take 1 tablet by mouth daily, Disp: , Rfl:     Vitamin D (Cholecalciferol) 25 MCG (1000 UT) CAPS, Take 1,000 Int'l Units by mouth, Disp: , Rfl:     lanolin ointment, Apply topically every hour as needed for other (sore nipples) (Patient not taking: Reported on 10/26/2023), Disp: , Rfl: .   OB History    Para Term  AB Living   1 1 1 0 0 1   SAB IAB Ectopic Multiple Live Births   0 0  0 0 1      # Outcome Date GA Lbr Jd/2nd Weight Sex Delivery Anes PTL Lv   1 Term 10/12/23 40w3d 01:05 / 01:14 3.25 kg (7 lb 2.6 oz) M Vag-Spont Nitrous, EPI N ESTHER      Name: Andreea Romeo      Apgar1: 7  Apgar5: 9       EXAM:  LMP 01/02/2023   BMI: There is no height or weight on file to calculate BMI.    ASSESSMENT:   Normal postpartum exam after  Vaginal, Spontaneous .    ICD-10-CM    1. Routine postpartum follow-up  Z39.2           PLAN:  Teaching: birth control and mental health  Family Planning:Mirena IUD  Continue a multivitamin/prenatal supplement, especially if breastfeeding.  Return at 6 weeks postpartum for physical and pelvic exam.  Will plan for IUD placement with 6 weeks PP    GHULAM Segura, CNM

## 2023-10-25 ASSESSMENT — EDINBURGH POSTNATAL DEPRESSION SCALE (EPDS)
I HAVE BEEN SO UNHAPPY THAT I HAVE BEEN CRYING: NO, NEVER
I HAVE BEEN SO UNHAPPY THAT I HAVE HAD DIFFICULTY SLEEPING: NOT AT ALL
I HAVE FELT SAD OR MISERABLE: NO, NOT AT ALL
I HAVE FELT SCARED OR PANICKY FOR NO GOOD REASON: NO, NOT MUCH
THE THOUGHT OF HARMING MYSELF HAS OCCURRED TO ME: NEVER
I HAVE BEEN ANXIOUS OR WORRIED FOR NO GOOD REASON: HARDLY EVER
THINGS HAVE BEEN GETTING ON TOP OF ME: NO, I HAVE BEEN COPING AS WELL AS EVER
I HAVE BLAMED MYSELF UNNECESSARILY WHEN THINGS WENT WRONG: NO, NEVER
I HAVE BEEN ABLE TO LAUGH AND SEE THE FUNNY SIDE OF THINGS: AS MUCH AS I ALWAYS COULD
I HAVE LOOKED FORWARD WITH ENJOYMENT TO THINGS: AS MUCH AS I EVER DID
TOTAL SCORE: 2

## 2023-10-26 ENCOUNTER — VIRTUAL VISIT (OUTPATIENT)
Dept: MIDWIFE SERVICES | Facility: CLINIC | Age: 33
End: 2023-10-26
Payer: COMMERCIAL

## 2023-10-26 PROCEDURE — 99024 POSTOP FOLLOW-UP VISIT: CPT | Performed by: ADVANCED PRACTICE MIDWIFE

## 2023-10-26 RX ORDER — CETIRIZINE HYDROCHLORIDE 10 MG/1
10 TABLET ORAL DAILY
COMMUNITY
End: 2024-03-19

## 2023-11-07 ENCOUNTER — MEDICAL CORRESPONDENCE (OUTPATIENT)
Dept: HEALTH INFORMATION MANAGEMENT | Facility: CLINIC | Age: 33
End: 2023-11-07
Payer: COMMERCIAL

## 2023-11-21 ENCOUNTER — MYC MEDICAL ADVICE (OUTPATIENT)
Dept: MIDWIFE SERVICES | Facility: CLINIC | Age: 33
End: 2023-11-21

## 2023-11-21 ENCOUNTER — PRENATAL OFFICE VISIT (OUTPATIENT)
Dept: MIDWIFE SERVICES | Facility: CLINIC | Age: 33
End: 2023-11-21
Payer: COMMERCIAL

## 2023-11-21 VITALS
HEIGHT: 68 IN | WEIGHT: 185 LBS | BODY MASS INDEX: 28.04 KG/M2 | SYSTOLIC BLOOD PRESSURE: 100 MMHG | DIASTOLIC BLOOD PRESSURE: 58 MMHG

## 2023-11-21 DIAGNOSIS — Z30.430 ENCOUNTER FOR INSERTION OF INTRAUTERINE CONTRACEPTIVE DEVICE: ICD-10-CM

## 2023-11-21 DIAGNOSIS — Z01.812 PRE-PROCEDURE LAB EXAM: ICD-10-CM

## 2023-11-21 DIAGNOSIS — Z97.5 IUD (INTRAUTERINE DEVICE) IN PLACE: ICD-10-CM

## 2023-11-21 DIAGNOSIS — N89.8 VAGINAL LESION: ICD-10-CM

## 2023-11-21 PROBLEM — Z34.00 SUPERVISION OF NORMAL FIRST PREGNANCY: Status: RESOLVED | Noted: 2023-02-28 | Resolved: 2023-11-21

## 2023-11-21 LAB — HCG UR QL: NEGATIVE

## 2023-11-21 PROCEDURE — 86695 HERPES SIMPLEX TYPE 1 TEST: CPT | Performed by: ADVANCED PRACTICE MIDWIFE

## 2023-11-21 PROCEDURE — 99207 PR POST PARTUM EXAM: CPT | Performed by: ADVANCED PRACTICE MIDWIFE

## 2023-11-21 PROCEDURE — 86696 HERPES SIMPLEX TYPE 2 TEST: CPT | Performed by: ADVANCED PRACTICE MIDWIFE

## 2023-11-21 PROCEDURE — 58300 INSERT INTRAUTERINE DEVICE: CPT | Performed by: ADVANCED PRACTICE MIDWIFE

## 2023-11-21 PROCEDURE — 36415 COLL VENOUS BLD VENIPUNCTURE: CPT | Performed by: ADVANCED PRACTICE MIDWIFE

## 2023-11-21 PROCEDURE — 81025 URINE PREGNANCY TEST: CPT | Performed by: ADVANCED PRACTICE MIDWIFE

## 2023-11-21 ASSESSMENT — ANXIETY QUESTIONNAIRES
7. FEELING AFRAID AS IF SOMETHING AWFUL MIGHT HAPPEN: NOT AT ALL
GAD7 TOTAL SCORE: 1
GAD7 TOTAL SCORE: 1
2. NOT BEING ABLE TO STOP OR CONTROL WORRYING: NOT AT ALL
6. BECOMING EASILY ANNOYED OR IRRITABLE: SEVERAL DAYS
IF YOU CHECKED OFF ANY PROBLEMS ON THIS QUESTIONNAIRE, HOW DIFFICULT HAVE THESE PROBLEMS MADE IT FOR YOU TO DO YOUR WORK, TAKE CARE OF THINGS AT HOME, OR GET ALONG WITH OTHER PEOPLE: SOMEWHAT DIFFICULT
1. FEELING NERVOUS, ANXIOUS, OR ON EDGE: NOT AT ALL
3. WORRYING TOO MUCH ABOUT DIFFERENT THINGS: NOT AT ALL
5. BEING SO RESTLESS THAT IT IS HARD TO SIT STILL: NOT AT ALL

## 2023-11-21 ASSESSMENT — PATIENT HEALTH QUESTIONNAIRE - PHQ9
5. POOR APPETITE OR OVEREATING: NOT AT ALL
SUM OF ALL RESPONSES TO PHQ QUESTIONS 1-9: 1

## 2023-11-21 NOTE — PATIENT INSTRUCTIONS
Your Intrauterine Device (IUD)     What to watch for right after IUD placement:    Some women may experience uterine cramps, bleeding, and/or dizziness during and right after IUD placement.     To help minimize the cramps, you may take ibuprofen 600 mg with food. These symptoms should improve over the next 24 hours.  Mild cramping may be present for a few days after IUD placement. You may continue taking ibuprofen as directed if needed.    You may experience spotting or bleeding for the first few weeks to months after IUD placement.      Other side effects can include:  Anemia, hemorrhage, partial or complete expulsion of device, uterine or cervical perforation, embedding of IUD in the uterine wall, increased risk of pelvic inflammatory disease.  Women who become pregnant with an IUD in place are at higher risk of an ectopic pregnancy.  There is also a higher risk of miscarriage when pregnancy occurs with an IUD in place.    Use condoms or abstain from sex for 7 days after the insertion of your Mirena or Kyleena or Alivia  IUD.  This is not necessary if you had a ParaGard IUD placed.    If you experience fever, chills, abdominal pain, worsening pelvic pain, dizziness, unusually heavy vaginal bleeding, suspected expulsion of device or foul smelling vaginal discharge please call the clinic for evaluation.    Please schedule an appointment at the clinic for a string check 4-6 weeks after IUD placement    Your periods may change (Alivia/Kyleena/Mirena):    For the first 3 to 6 months, your monthly period may become irregular. You may also have frequent spotting or light bleeding. A few women have heavy bleeding during this time. After your body adjusts, the number of bleeding days is likely to decrease (but may remain irregular), and you may even find that your periods stop altogether for as long as Alivia/Mirena is in place.  Your periods will return rapidly once the IUD is removed.      ParaGard IUD users:    ParaGard  IUD users may experience heavier than normal cycles while their IUD is in place, this is considered normal   Back-up contraception is not needed      Checking for your strings:    We encourage everyone with an IUD in place to check for their strings monthly    You may check your own IUD strings by inserting a finger into the vagina and feeling the strings as they exit the cervix.  The strings will initially feel firm, like fishing line, but will soften over a few weeks.  After the strings have softened, you or your partner should not be able to feel the strings during intercourse.     If the string length greatly changes or if you cannot feel your strings at all please make an appointment to see you midwife and use a backup method of contraception like a condom.    If you can feel something hard/plastic like the IUD may not be in the correct place. You should then see your healthcare provider to have the position confirmed with ultrasound.       Remember:    IUD's do not protect against HIV or STIs.  IUD's do not prevent the formation of ovarian cysts.  IUD's do not typically reduce acne or cause weight gain or mood changes.    For more information:  [email or web address]-us.com/    The ParaGard IUD is effective for 12 years.  The Alivia IUD is effective for 3 years.  The Kyleena IUD is effective for 5 years.  The Mirena IUD is effective for 8 years.  Your IUD can easily be removed by a healthcare professional at any time.    Do not try to remove the IUD yourself.      If you have questions or concerns please call:    [facility]   [phone]

## 2023-11-21 NOTE — PROGRESS NOTES
Midwife Postpartum 6 Week Visit    Meredith Dean is a 32 year old here for a postpartum checkup. Doing well, no concerns, she is going back to work .     Delivery date was 10/12/2023. She had a  of a viable boy, named Andreea Romeo, weight 7 pounds 2.6 oz., with none complications      Since delivery, she has been breast feeding every 2-3 hours.  She has had any signs of infection, her lochia stopped after 3 weeks.  She has had other complications.  Patient states she has signs of a herpes outbreak on the perineum. Her partner has known HSV-2 so wondering if its happening.     She is voiding and having bowel movements without difficulty.       Contraception was discussed and patient desires Mirena IUD. This will be her 3rd IUD  She has had intercourse since delivery and reports no pain/concerns. Date of last intercourse was 11/10/2023. She complains of No  perineal discomfort.     Mood is Stable  Patient screened for postpartum depression.   Depression Rating was:   Last PHQ-9 score on record =       2023     1:16 PM   PHQ-9 SCORE   PHQ-9 Total Score 1     Last GAD7 score on record =       2023     1:16 PM   NARGIS-7 SCORE   Total Score 1     Alcohol Score = 1    ROS:  12 point review of systems negative other than symptoms noted below or in the HPI.       Current Outpatient Medications:     cetirizine (ZYRTEC) 10 MG tablet, Take 10 mg by mouth daily, Disp: , Rfl:     ferrous fumarate-vitamin C ER (ATIF-SEQUELS) 65-25 MG CR tablet, Take 1 tablet by mouth daily (with breakfast), Disp: , Rfl:     levonorgestrel (MIRENA) 52 MG (20 mcg/day) IUD, 1 each by Intrauterine route once, Disp: , Rfl:     Prenat Vit-FePoly-Methylfol-FA (PNV-IRON PO), , Disp: , Rfl:     vitamin B complex with vitamin C (STRESS TAB) tablet, Take 1 tablet by mouth daily, Disp: , Rfl:     Vitamin D (Cholecalciferol) 25 MCG (1000 UT) CAPS, Take 1,000 Int'l Units by mouth, Disp: , Rfl:   No current facility-administered  "medications for this visit..   OB History    Para Term  AB Living   1 1 1 0 0 1   SAB IAB Ectopic Multiple Live Births   0 0 0 0 1      # Outcome Date GA Lbr Jd/2nd Weight Sex Delivery Anes PTL Lv   1 Term 10/12/23 40w3d 01:05 / 01:14 3.25 kg (7 lb 2.6 oz) M Vag-Spont Nitrous, EPI N ESTHER      Name: Andreea Romeo      Apgar1: 7  Apgar5: 9         EXAM:  /58 (BP Location: Right arm)   Ht 1.727 m (5' 8\")   Wt 83.9 kg (185 lb)   LMP 2023   Breastfeeding Yes   BMI 28.13 kg/m    BMI: Body mass index is 28.13 kg/m .  Constitutional: healthy, alert and no distress  Neck: symmetrical, thyroid normal size, no masses present, no lymphadenopathy present.   Breast: deferred, patient lactating.  during visit  Abdomen: soft, non-tender, diastasis <1 FB's  PELVIC EXAM:  Vulva: 2 crusted over lesions on right labia swabbed for HSV, repair is well healed, no tenderness  Vagina: Moist, pink, discharge normal  well rugated, no lesions  Cervix:smooth, pink, no visible lesions  Rectal exam: deferred      ASSESSMENT:   Normal postpartum exam after .    ICD-10-CM    1. Routine postpartum follow-up  Z39.2       2. Encounter for insertion of intrauterine contraceptive device  Z30.430 levonorgestrel (MIRENA) 52 MG (20 mcg/day) IUD     levonorgestrel (MIRENA) 52 MG (20 mcg/day) IUD 1 each     INSERTION INTRAUTERINE DEVICE     HCG qualitative urine     HCG qualitative urine      3. Pre-procedure lab exam  Z01.812 HCG qualitative urine     HCG qualitative urine      4. IUD (intrauterine device) in place  Z97.5       5. Vaginal lesion  N89.8 Herpes Simplex Virus 1 and 2 IgG     Herpes Simplex Virus 1 and 2 IgG            PLAN:  Results for orders placed or performed in visit on 23   HCG qualitative urine     Status: Normal   Result Value Ref Range    hCG Urine Qualitative Negative Negative       Return as needed or at time of next expected pap, pelvic, or breast exam.  Teaching: exercise and " birth control  Family Planning:Mirena IUD  Discussed pregnancy spacing  Encourage Kegels and abdominal exercise.  Continue a multivitamin/prenatal supplement, especially if breastfeeding.  Discussed HSV, appears to have 2 lesions consistent with HSV, blood test ordered and swab collected  Return to clinic:  one year for annual exam    Angelitachantell Doyle  [facility]    I was present with the CN student who participated in the documentation of the services provided. I have verified the history and personally performed the physical exam and medical decision making, as documented by the student and edited by me.     GHULAM Segura, ROBERTA    23        *after review wrong swab collected for sample, viral culture needed, patient notified and will return to clinic tomorrow for repeat culture, HSV by IgG pending      IUD Insertion:  CONSULT:    Is a pregnancy test required: Yes.  Was it positive or negative?  Negative  Was a consent obtained?  Yes    Subjective: Meredith Dean is a 32 year old  presents for IUD and desires Mirena type IUD.    Patient has been given the opportunity to ask questions about all forms of birth control, including all options appropriate for Meredith Dean. Discussed that no method of birth control, except abstinence is 100% effective against pregnancy or sexually transmitted infection.     Meredith Dean understands she may have the IUD removed at any time. IUD should be removed by a health care provider.    The entire insertion procedure was reviewed with the patient, including care after placement. Reviewed risks including perforation, expulsion, migration, and infection    Patient's last menstrual period was 2023. Last sexual activity: 11/10/2023 . No allergy to betadine or shellfish. Patient desires STD screening  hCG Urine Qualitative   Date Value Ref Range Status   2023 Negative Negative Final     Comment:     This test is for screening  "purposes.  Results should be interpreted along with the clinical picture.  Confirmation testing is available if warranted by ordering [id number], HCG Quantitative Pregnancy.         /58 (BP Location: Right arm)   Ht 1.727 m (5' 8\")   Wt 83.9 kg (185 lb)   LMP 01/02/2023   Breastfeeding Yes   BMI 28.13 kg/m      Pelvic Exam:   Vagina: moist, pink, rugae with physiologic discharge and secretions  Cervix: parous, no lesions and pink, moist, closed, without lesion or CMT  Uterus: anteverted position, mobile, no pain    PROCEDURE NOTE: -- IUD Insertion    Reason for Insertion: contraception    Under sterile technique, cervix was visualized with speculum and prepped with Betadine solution swab x 3. The uterus was sounded to 7.0 cm. IUD prepared for placement, and IUD inserted according to 's instructions without difficulty or significant resitance, and deployed at the fundus. The strings were visualized and trimmed to 3.0 cm from the external os. Tenaculum was removed and hemostasis noted. Speculum removed.  Patient tolerated procedure well.    Lot # [id number]  Exp: 10/01/2025    EBL: minimal    Complications: none    ASSESSMENT:     ICD-10-CM    1. Routine postpartum follow-up  Z39.2       2. Encounter for insertion of intrauterine contraceptive device  Z30.430 levonorgestrel (MIRENA) 52 MG (20 mcg/day) IUD     levonorgestrel (MIRENA) 52 MG (20 mcg/day) IUD 1 each     INSERTION INTRAUTERINE DEVICE     HCG qualitative urine     HCG qualitative urine      3. Pre-procedure lab exam  Z01.812 HCG qualitative urine     HCG qualitative urine      4. IUD (intrauterine device) in place  Z97.5       5. Vaginal lesion  N89.8 Herpes Simplex Virus 1 and 2 IgG     Herpes Simplex Virus 1 and 2 IgG           PLAN:  Given 's handouts, including when to have IUD removed, list of danger s/sx, side effects and follow up recommended. Encouraged condom use for prevention of STD. Back up contraception advised for 7 " days if progestin method. Advised to call for any fever, for prolonged or severe pain or bleeding, abnormal vaginal discharge, or unable to palpate strings. She was advised to use pain medications (ibuprofen) as needed for mild to moderate pain. Advised to follow-up in clinic as needed for IUD string check if unable to find strings or as directed by provider.     Angelita Doyle    Billing Provider Documentation:    I was present for the entire procedure in which the student participated in and agree with the student documentation.      GHULAM Segura, INGA    11/21/23

## 2023-11-22 ENCOUNTER — OFFICE VISIT (OUTPATIENT)
Dept: MIDWIFE SERVICES | Facility: CLINIC | Age: 33
End: 2023-11-22
Payer: COMMERCIAL

## 2023-11-22 DIAGNOSIS — B00.9 HSV INFECTION: ICD-10-CM

## 2023-11-22 DIAGNOSIS — Z11.3 SCREENING FOR STD (SEXUALLY TRANSMITTED DISEASE): Primary | ICD-10-CM

## 2023-11-22 LAB
HSV1 IGG SERPL QL IA: 28.6 INDEX
HSV1 IGG SERPL QL IA: ABNORMAL
HSV2 IGG SERPL QL IA: 0.2 INDEX
HSV2 IGG SERPL QL IA: ABNORMAL

## 2023-11-22 PROCEDURE — 99213 OFFICE O/P EST LOW 20 MIN: CPT | Mod: 24 | Performed by: ADVANCED PRACTICE MIDWIFE

## 2023-11-22 PROCEDURE — 87529 HSV DNA AMP PROBE: CPT | Performed by: ADVANCED PRACTICE MIDWIFE

## 2023-11-22 RX ORDER — VALACYCLOVIR HYDROCHLORIDE 500 MG/1
500 TABLET, FILM COATED ORAL DAILY
Qty: 90 TABLET | Refills: 3 | Status: SHIPPED | OUTPATIENT
Start: 2023-11-22 | End: 2024-09-01

## 2023-11-22 NOTE — PROGRESS NOTES
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 Has a hx of HSV1. Partner is positive for HSV2.    Meredith was here yesterday and the wrong swab was collected.     O: External Genitalia:     Normal appearance for age, no discharge present, no tenderness present,two areas on R labia that are consistent for HSV lesions. One area has started to crust over, but two small areas where shiny ulcers (mm's in size) appear open, not oozing with discharge. Crust removed and two more shiny ulcers visualized.               Another area of concern further above on R labia has two small slightly shiny ulcers consistent with               HSV lesions    A/P:    Discussed findings on exam. Suspicious for HSV2 lesions  Prescription sent for daily suppressive therapy. Valtrex 500 mg PO, daily.  500mg BID x 3 days with an active outbreak.     Damaris PARMAR CNM

## 2023-11-23 LAB
HSV1 DNA SPEC QL NAA+PROBE: NOT DETECTED
HSV2 DNA SPEC QL NAA+PROBE: NOT DETECTED

## 2023-11-27 ENCOUNTER — LAB (OUTPATIENT)
Dept: LAB | Facility: CLINIC | Age: 33
End: 2023-11-27
Attending: FAMILY MEDICINE
Payer: COMMERCIAL

## 2023-11-27 ENCOUNTER — E-VISIT (OUTPATIENT)
Dept: URGENT CARE | Facility: CLINIC | Age: 33
End: 2023-11-27
Payer: COMMERCIAL

## 2023-11-27 DIAGNOSIS — J02.9 SORE THROAT: ICD-10-CM

## 2023-11-27 DIAGNOSIS — U07.1 INFECTION DUE TO 2019 NOVEL CORONAVIRUS: Primary | ICD-10-CM

## 2023-11-27 DIAGNOSIS — U07.1 INFECTION DUE TO 2019 NOVEL CORONAVIRUS: ICD-10-CM

## 2023-11-27 LAB
DEPRECATED S PYO AG THROAT QL EIA: NEGATIVE
GROUP A STREP BY PCR: NOT DETECTED

## 2023-11-27 PROCEDURE — 87651 STREP A DNA AMP PROBE: CPT

## 2023-11-27 PROCEDURE — 99421 OL DIG E/M SVC 5-10 MIN: CPT | Performed by: FAMILY MEDICINE

## 2023-11-27 NOTE — PATIENT INSTRUCTIONS
Thank you for choosing us for your care. Given your symptoms, I would like you to do a lab-only visit to determine what is causing them.  I have placed the orders.  Please schedule an appointment with the lab right here in [facility], or call [phone].  I will let you know when the results are back and next steps to take.     We are unable to treat COVID through evisit at this time.  To discuss COVID treatment:  Call your clinic OR [phone] ([phone]) and ask to speak to a nurse about a positive COVID test  Or   Schedule a Virtual Visit through [facility] to discuss Covid treatment options. Virtual Urgent Care is open 7 days a week

## 2023-12-08 ENCOUNTER — MEDICAL CORRESPONDENCE (OUTPATIENT)
Dept: HEALTH INFORMATION MANAGEMENT | Facility: CLINIC | Age: 33
End: 2023-12-08
Payer: COMMERCIAL

## 2024-03-03 ENCOUNTER — VIRTUAL VISIT (OUTPATIENT)
Dept: URGENT CARE | Facility: CLINIC | Age: 34
End: 2024-03-03

## 2024-03-03 DIAGNOSIS — J01.90 ACUTE SINUSITIS WITH SYMPTOMS > 10 DAYS: Primary | ICD-10-CM

## 2024-03-03 PROCEDURE — 99213 OFFICE O/P EST LOW 20 MIN: CPT | Mod: 95

## 2024-03-03 RX ORDER — FLUCONAZOLE 150 MG/1
150 TABLET ORAL
Qty: 2 TABLET | Refills: 0 | Status: SHIPPED | OUTPATIENT
Start: 2024-03-03 | End: 2024-03-19

## 2024-03-03 NOTE — PROGRESS NOTES
"  Meredith Dean is a 33 year old female who is being evaluated via a billable telephone visit.      The patient has been notified of following at the time patient scheduled visit:     \"This telephone visit will be conducted via a phone call between you and your physician/provider. We have found that certain health care needs can be provided without the need for a physical exam.  This service lets us provide the care you need with a phone conversation.  If a prescription is necessary we can send it directly to your pharmacy.  If lab work is needed we can place an order for that and you can then stop by our lab to have the test done at a later time.\"   Patient has given consent for telephone visit?  Yes    SUBJECTIVE:  Meredith Dean is an 33 year old female who presents for ongoing nasal congestion and sinus pressure.  Sxs started about 10 days ago with some nasal congestion and sore throat.  No fevers.  Kids have been sick also.  Seemed to be improving overall, but then nasal congestion worsened and developed sinus headache.  No fevers.  Has taken a little ibuprofen which helps minimally.  Sxs seem to be worsening.  No fevers.  Is currently breast feeding.    PMH:   has a past medical history of Dupuytren contracture and Migraine.  Patient Active Problem List   Diagnosis    Dupuytren's contracture of left hand    Intractable migraine with aura without status migrainosus    IUD (intrauterine device) in place     Social History     Socioeconomic History    Marital status:      Spouse name: Abhinav   Occupational History    Occupation: PA [employer]   Tobacco Use    Smoking status: Never    Smokeless tobacco: Never   Vaping Use    Vaping Use: Never used   Substance and Sexual Activity    Alcohol use: Yes    Drug use: Never    Sexual activity: Yes     Partners: Male     Birth control/protection: Condom, I.U.D.     Comment: Mirena IUD inserted 11/21/2023     Family History   Problem Relation Age of Onset    " Other - See Comments Father     Cancer Father     Pulmonary fibrosis Maternal Grandmother     Diabetes Maternal Grandfather     Stomach Cancer Maternal Grandfather     Leukemia Maternal Grandfather     Multiple myeloma Maternal Grandfather     Dementia Paternal Grandmother     Hypertension Paternal Grandmother     Cerebrovascular Disease Paternal Grandmother     Heart Disease Paternal Grandfather     Myocardial Infarction Paternal Grandfather        ALLERGIES:  Patient has no known allergies.    Current Outpatient Medications   Medication    cetirizine (ZYRTEC) 10 MG tablet    ferrous fumarate-vitamin C ER (ATIF-SEQUELS) 65-25 MG CR tablet    levonorgestrel (MIRENA) 52 MG (20 mcg/day) IUD    Prenat Vit-FePoly-Methylfol-FA (PNV-IRON PO)    valACYclovir (VALTREX) 500 MG tablet    vitamin B complex with vitamin C (STRESS TAB) tablet    Vitamin D (Cholecalciferol) 25 MCG (1000 UT) CAPS     No current facility-administered medications for this visit.         ROS:  ROS is done and is negative for general/constitutional, eye, ENT, Respiratory, cardiovascular, GI, , Skin, musculoskeletal except as noted elsewhere.  All other review of systems negative except as noted elsewhere.      OBJECTIVE:    No vital signs taken as is virtual visit.  GENERAL : Alert and oriented.  No distress detected.    RESP: No respiratory distress detected during phone conversation  ENT: sounds congested        ASSESSMENT/PLAN:    ASSESSMENT / PLAN:  (J01.90) Acute sinusitis with symptoms > 10 days  (primary encounter diagnosis)  Comment: pt reports gets yeast infection often with abx  Plan: amoxicillin-clavulanate (AUGMENTIN) 875-125 MG         tablet, fluconazole (DIFLUCAN) 150 MG tablet        Reviewed medication instructions and side effects. Follow up if experiences side effects.. I reviewed supportive care, otc meds to use if needed, expected course, and signs of concern.  Follow up as needed or if does not improve within 7 day(s) or if  worsens in any way.  Reviewed red flag symptoms and is to go to the ER if experiences any of these.        See [facility] for orders, medications, letters, patient instructions    Anastacia Blank MD  3/3/2024, 10:06 AM      Phone call duration:  11 minutes

## 2024-03-10 ENCOUNTER — HEALTH MAINTENANCE LETTER (OUTPATIENT)
Age: 34
End: 2024-03-10

## 2024-03-19 ENCOUNTER — VIRTUAL VISIT (OUTPATIENT)
Dept: FAMILY MEDICINE | Facility: CLINIC | Age: 34
End: 2024-03-19

## 2024-03-19 DIAGNOSIS — J03.90 EXUDATIVE TONSILLITIS: Primary | ICD-10-CM

## 2024-03-19 DIAGNOSIS — Z20.818 EXPOSURE TO STREP THROAT: ICD-10-CM

## 2024-03-19 PROCEDURE — 99213 OFFICE O/P EST LOW 20 MIN: CPT | Mod: 95 | Performed by: FAMILY MEDICINE

## 2024-03-19 NOTE — PROGRESS NOTES
Meredith is a 33 year old who is being evaluated via a billable video visit.    How would you like to obtain your AVS? MyChart  If the video visit is dropped, the invitation should be resent by: Text to cell phone: [phone]  Will anyone else be joining your video visit? No      Assessment & Plan     Exudative tonsillitis  We discussed treatment options and decided to start Augmentin for exudative tonsillitis and presumed strep throat.  She may take Tylenol or ibuprofen as needed.  If symptoms do not improve she will let us know.  - amoxicillin-clavulanate (AUGMENTIN) 875-125 MG tablet; Take 1 tablet by mouth 2 times daily for 10 days    Exposure to strep throat                  Subjective   Meredith is a 33 year old, presenting for the following health issues:  No chief complaint on file.        3/19/2024     6:54 AM   Additional Questions   Roomed by Jennifer     History of Present Illness       Reason for visit:  Woke with sore throat, exudates on tonsils. Recent travel. 5 year old recent strep.  Symptom onset:  Today  Symptoms include:  Sore throat  Symptom intensity:  Moderate  Symptom progression:  Staying the same  Had these symptoms before:  Yes  Has tried/received treatment for these symptoms:  Yes  Previous treatment was successful:  Yes  Prior treatment description:  Antibiotics when strep positive    She eats 4 or more servings of fruits and vegetables daily.She consumes 0 sweetened beverage(s) daily.She exercises with enough effort to increase her heart rate 10 to 19 minutes per day.  She exercises with enough effort to increase her heart rate 3 or less days per week.   She is taking medications regularly.       Acute Illness  Acute illness concerns: Sore Throat with tonsillar hypertrophy and several exudates  Onset/Duration: 1 day  Symptoms:  Fever: YES  Chills/Sweats: YES  Headache (location?): No - Last night   Sinus Pressure: No  Conjunctivitis:  No  Ear Pain: no  Rhinorrhea: No  Congestion: No  Sore  Throat: YES  Cough: no  Wheeze: No  Decreased Appetite: No  Nausea: No  Vomiting: No  Diarrhea: No  Dysuria/Freq.: No  Dysuria or Hematuria: No  Fatigue/Achiness: YES  Sick/Strep Exposure: YES.  5-year-old son recently had strep throat and 5-month-old child is currently ill.  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      Objective           Vitals:  No vitals were obtained today due to virtual visit.    Physical Exam   GENERAL: Appears mildly fatigued and no distress  EYES: Eyes grossly normal to inspection.  No discharge or erythema, or obvious scleral/conjunctival abnormalities.  RESP: No audible wheeze, cough, or visible cyanosis.    SKIN: Visible skin clear. No significant rash, abnormal pigmentation or lesions.  NEURO: Cranial nerves grossly intact.  Mentation and speech appropriate for age.  PSYCH: Appropriate affect, tone, and pace of words          Video-Visit Details    Type of service:  Video Visit   Originating Location (pt. Location): Home    Distant Location (provider location):  On-site  Platform used for Video Visit: Meghan  Signed Electronically by: Saad Siegel DO

## 2024-07-06 ENCOUNTER — MYC MEDICAL ADVICE (OUTPATIENT)
Dept: MIDWIFE SERVICES | Facility: CLINIC | Age: 34
End: 2024-07-06
Payer: COMMERCIAL

## 2024-07-06 DIAGNOSIS — Z30.431 IUD CHECK UP: Primary | ICD-10-CM

## 2024-07-08 NOTE — TELEPHONE ENCOUNTER
Routing pt [employer] message to provider to advise.  11/21/2023 IUD placed PP  US pended if appropriate    Flor Demarco RN on 7/8/2024 at 12:13 PM     Rhomboid Transposition Flap Text: The defect edges were debeveled with a #15 scalpel blade.  Given the location of the defect and the proximity to free margins a rhomboid transposition flap was deemed most appropriate.  Using a sterile surgical marker, an appropriate rhomboid flap was drawn incorporating the defect.    The area thus outlined was incised deep to adipose tissue with a #15 scalpel blade.  The skin margins were undermined to an appropriate distance in all directions utilizing iris scissors.

## 2024-07-15 ENCOUNTER — TELEPHONE (OUTPATIENT)
Dept: MIDWIFE SERVICES | Facility: CLINIC | Age: 34
End: 2024-07-15

## 2024-07-15 ENCOUNTER — ANCILLARY PROCEDURE (OUTPATIENT)
Dept: ULTRASOUND IMAGING | Facility: CLINIC | Age: 34
End: 2024-07-15
Attending: ADVANCED PRACTICE MIDWIFE
Payer: COMMERCIAL

## 2024-07-15 DIAGNOSIS — Z30.431 IUD CHECK UP: ICD-10-CM

## 2024-07-15 PROCEDURE — 76830 TRANSVAGINAL US NON-OB: CPT

## 2024-07-15 PROCEDURE — 76856 US EXAM PELVIC COMPLETE: CPT

## 2024-07-15 NOTE — TELEPHONE ENCOUNTER
Called pt after talking to midwives about the US results  Advised to have a GYN visit/consult    Called pt and offered: 7/17 at 1515  video visit w Dr Sanchez for consult on IUD and US results. Pt verbalized understanding, in agreement with plan, and voiced no further questions.    Flor Demarco RN on 7/15/2024 at 2:16 PM

## 2024-07-15 NOTE — TELEPHONE ENCOUNTER
RN calling number for Dr Vasquez - radiologist    US today for IUD placement check  1 IUD arm is full thickness through the uterus - hard to say on US how far; he already signed off on report  Abdominal pain could be a result of this    Pt scheduled for f/up visit this afternoon to review US results and advise.    Flor Demarco RN on 7/15/2024 at 1:59 PM

## 2024-07-15 NOTE — TELEPHONE ENCOUNTER
Health Call Center    Phone Message    May a detailed message be left on voicemail: yes     Reason for Call: Vera from [facility] calling stating Dr Saldivar is wanting to speak with [facility] or care team member regarding pt recent US results. Please call [phone]    Action Taken: Message routed to:  Other: [facility]    Travel Screening: Not Applicable

## 2024-07-17 ENCOUNTER — VIRTUAL VISIT (OUTPATIENT)
Dept: OBGYN | Facility: CLINIC | Age: 34
End: 2024-07-17
Payer: COMMERCIAL

## 2024-07-17 ENCOUNTER — PREP FOR PROCEDURE (OUTPATIENT)
Dept: OBGYN | Facility: CLINIC | Age: 34
End: 2024-07-17

## 2024-07-17 DIAGNOSIS — T83.32XD DISPLACEMENT OF INTRAUTERINE CONTRACEPTIVE DEVICE, SUBSEQUENT ENCOUNTER: Primary | ICD-10-CM

## 2024-07-17 DIAGNOSIS — T83.32XD MALPOSITIONED INTRAUTERINE DEVICE (IUD), SUBSEQUENT ENCOUNTER: Primary | ICD-10-CM

## 2024-07-17 PROCEDURE — 99214 OFFICE O/P EST MOD 30 MIN: CPT | Mod: 95 | Performed by: OBSTETRICS & GYNECOLOGY

## 2024-07-17 NOTE — PROGRESS NOTES
Meredith Dean is a 33 year old female who is being evaluated for a billable video visit.     What phone number would you like to be contacted at? [phone]  How would you like to obtain your AVS? [place]      Originating Location (pt. Location): Work       Distant Location (provider location):  [facility]       SUBJECTIVE:                                                   Meredith Dean is a 33 year old female who presents for virtual visit today for the following health issue(s):  No chief complaint on file.      Additional information: Consult on IUD     HPI:  IUD inserted 6wk pp by midwifery student.   Things were fine for 4-5mo then started having rare spotting. Then bleeding became more frequent. This is unusual considering is her 4th IUD. Had been having pain with sex as well.     Having low back pain on right side.   Pain with sex is generalized cramping.     Is BFing    Vit d, mag, IUD. Only meds    Has done well with MAC.       No LMP recorded. (Menstrual status: IUD)..     Patient is sexually active, .  Using IUD for contraception.    reports that she has never smoked. She has never used smokeless tobacco.      Health maintenance updated:  yes    Today's PHQ-2 Score:       3/19/2024     6:25 AM   PHQ-2 (  [employer])   Q1: Little interest or pleasure in doing things 0   Q2: Feeling down, depressed or hopeless 0   PHQ-2 Score 0   Q1: Little interest or pleasure in doing things Not at all   Q2: Feeling down, depressed or hopeless Not at all   PHQ-2 Score 0     Today's PHQ-9 Score:       2023     1:16 PM   PHQ-9 SCORE   PHQ-9 Total Score 1     Today's NARGIS-7 Score:       2023     1:16 PM   NARGIS-7 SCORE   Total Score 1       Problem list and histories reviewed & adjusted, as indicated.  Additional history: as documented.    Patient Active Problem List   Diagnosis    Dupuytren's contracture of left hand    Intractable migraine with aura without status migrainosus    IUD  (intrauterine device) in place     Past Surgical History:   Procedure Laterality Date    FINGER SURGERY Left     Fasciectomy x 2    INSERT INTRAUTERINE DEVICE  11/21/2023    Mirena IUD    LABIALPLASTY      VULVA SURGERY      wisdom teeth        Social History     Tobacco Use    Smoking status: Never    Smokeless tobacco: Never   Substance Use Topics    Alcohol use: Yes      Problem (# of Occurrences) Relation (Name,Age of Onset)    Dementia (1) Paternal Grandmother    Cancer (1) Father    Diabetes (1) Maternal Grandfather    Heart Disease (1) Paternal Grandfather    Hypertension (1) Paternal Grandmother    Cerebrovascular Disease (1) Paternal Grandmother    Myocardial Infarction (1) Paternal Grandfather    Multiple myeloma (1) Maternal Grandfather    Pulmonary fibrosis (1) Maternal Grandmother    Other - See Comments (1) Father    Leukemia (1) Maternal Grandfather    Stomach Cancer (1) Maternal Grandfather              Current Outpatient Medications   Medication Sig Dispense Refill    levonorgestrel (MIRENA) 52 MG (20 mcg/day) IUD 1 each by Intrauterine route once      Prenat Vit-FePoly-Methylfol-FA (PNV-IRON PO)       valACYclovir (VALTREX) 500 MG tablet Take 1 tablet (500 mg) by mouth daily 90 tablet 3    Vitamin D (Cholecalciferol) 25 MCG (1000 UT) CAPS Take 1,000 Int'l Units by mouth       No current facility-administered medications for this visit.     No Known Allergies      OBJECTIVE:     No vitals were obtained today due to virtual video visit.    Physical Exam  GENERAL: alert and no distress  EYES: Eyes grossly normal to inspection.  No discharge or erythema, or obvious scleral/conjunctival abnormalities.  RESP: No audible wheeze, cough, or visible cyanosis.    SKIN: Visible skin clear. No significant rash, abnormal pigmentation or lesions.  NEURO: Cranial nerves grossly intact.  Mentation and speech appropriate for age.  PSYCH: Appropriate affect, tone, and pace of words      Results for orders placed or  performed in visit on 07/15/24   US Pelvic Complete with Transvaginal    Addendum: 7/15/2024    COMMUNICATION ADDENDUM:  IUD findings were discussed with MALICK Chin on behalf of the ordering provider at 7/15/2024 1:58 PM CDT.    END ADDENDUM      Narrative    EXAM: US PELVIC TRANSABDOMINAL AND TRANSVAGINAL  LOCATION: [facility]  DATE: 7/15/2024    INDICATION:  IUD check up, pelvic pain  COMPARISON: None.  TECHNIQUE: Transabdominal scans were performed. Endovaginal ultrasound was performed to better visualize the adnexa.    FINDINGS:    UTERUS: 7.4 x 4.2 x 3.3 cm. Normal in size and position with no mass.    ENDOMETRIUM: 3 mm. Normal smooth endometrium. Low-lying intrauterine contraceptive device located within the lower endometrial canal. One of the IUD arms penetrates through the anterior myometrium to contact the anterior serosal surface where there is   focal bulging.    RIGHT OVARY: 3.4 x 2.5 x 1.8 cm. Normal.    LEFT OVARY: 3.1 x 2.2 x 1.4 cm. There is either a 1.4 cm exophytic simple follicle versus paraovarian cyst.    Trace anechoic free fluid in the cul-de-sac.        Impression    IMPRESSION:  1.  Malpositioned IUD within the lower endometrial canal. One of the IUD arms penetrates full-thickness through the anterior myometrium to the anterior serosal surface where there is focal bulging. Differential includes full-thickness myometrial   penetration versus perforation.  2.  Either 1.4 cm exophytic simple left ovarian follicle versus paraovarian cyst. Both entities are benign and warrant no follow-up.  3.  Trace simple pelvic free fluid.               ASSESSMENT/PLAN:                                                      Video duration: 16:57 minutes    No diagnosis found.    There are no Patient Instructions on file for this visit.    ***  Will plan hysteroscopic IUD removal with ultrasound guidance intraoperatively, possible laparoscopy if arm of IUD is left embedded within the  myometrium and still abuts the serosa. Discussed compelling ultrasound evidence would be needed to decide to move to laparoscopy.   Can do hysteroscopy with MAC.   Will do preop evaluation at hospital.     Marta Crowe Masters, [facility] FOR [facility] - [place], [place]

## 2024-07-19 ENCOUNTER — TELEPHONE (OUTPATIENT)
Dept: OBGYN | Facility: CLINIC | Age: 34
End: 2024-07-19
Payer: COMMERCIAL

## 2024-07-19 NOTE — TELEPHONE ENCOUNTER
Pre-op dx - Malpositioned intrauterine device (IUD), subsequent encounter [T83.32XD]       ERAS BAG GIVEN No  ERAS INSTRUCTIONS EXPLAINED No    ASSIST: (MD, PA, CNM, NONE) PA    H&P TO BE COMPLETED BY:   Surgeon  FOR H&P TO BE DONE BY SURGEON   UPDATE VISIT ON DATE AT HOSPITAL  SAME DAY/OBSERVATION/INPATIENT: STRAIGHT SAME DAY  EQUIPMENT:   OPERATIVE HYSTEROSCOPE, HYSTEROSCOPIC INSTRUMENTS SUCH AS GRASPERS, POLYP REMOVAL FORCEPS.  ULTRASOUND GUIDANCE  LOW CHANCE OF NEED FOR LAPAROSCOPY  VENDOR NEEDED AT CASE: NO  IF IUD WHAT BRAND NA  LABS/SPECIAL INSTRUCTIONS: CBC, TYPE AND SCREEN  TIME OFF WORK: 2-10 DAYS PENDING FINAL SURGERY  DO YOU ESTIMATE EXTRA TIME NEEDED THAN YOUR AVERAGE ON THIS CASE? YES  IF YES, HOW MUCH EXTRA TIME IN MINUTES? 60-90MIN NEEDED FOR CASE  POST OP TO BE SCHEDULED AT 4 WEEKS AFTER SX APPOINTMENT LENGTH IN MINUTES 15-ONLY IF NEEDS TO DO LAPAROSCOPY, OTHERWISE SHE DOES NOT NECESSARILY NEED TO RETURN FOR VISIT

## 2024-07-19 NOTE — TELEPHONE ENCOUNTER
Marta Sanchez DO  P We Surgery Scheduling  Patient Name: Meredith Dean  MRN: [id number]  Case#: [id number]  Surgeons and Role:  Panel 1:     * Marta Sanchez DO - Primary  Panel 2:     * Marta Sanchez DO - Primary  Date requested: * No dates entered *  Location:  OR  Procedure(s):  HYSTEROSCOPIC INTRAUTERINE DEVICE REMOVAL WITH ULTRASOUND GUIDANCE, POSSIBLE LAPAROSCOPIC INTRAUTERINE DEVICE REMOVAL (N/A)  HYSTEROSCOPIC INTRAUTERINE DEVICE REMOVAL WITH ULTRASOUND GUIDANCE, POSSIBLE LAPAROSCOPY (N/A)  Requested Time:  Requested Length:  minutes.    This case was generated via a case request order.          Case Information    Patient: Meredith Dean [[id number]]   Case:  [id number]

## 2024-07-22 ENCOUNTER — PREP FOR PROCEDURE (OUTPATIENT)
Dept: OBGYN | Facility: CLINIC | Age: 34
End: 2024-07-22
Payer: COMMERCIAL

## 2024-07-22 DIAGNOSIS — T83.32XD: Primary | ICD-10-CM

## 2024-07-22 NOTE — TELEPHONE ENCOUNTER
ERAS BAG GIVEN No  ERAS INSTRUCTIONS EXPLAINED No     ASSIST: (MD, PA, CNM, NONE) PA     H&P TO BE COMPLETED BY:              Surgeon  FOR H&P TO BE DONE BY SURGEON              UPDATE VISIT ON DATE AT HOSPITAL  SAME DAY/OBSERVATION/INPATIENT: STRAIGHT SAME DAY  EQUIPMENT:   OPERATIVE HYSTEROSCOPE, HYSTEROSCOPIC INSTRUMENTS SUCH AS GRASPERS, POLYP REMOVAL FORCEPS.  ULTRASOUND GUIDANCE  LOW CHANCE OF NEED FOR LAPAROSCOPY  VENDOR NEEDED AT CASE: NO  IF IUD WHAT BRAND NA  LABS/SPECIAL INSTRUCTIONS: CBC, TYPE AND SCREEN  TIME OFF WORK: 2-10 DAYS PENDING FINAL SURGERY  DO YOU ESTIMATE EXTRA TIME NEEDED THAN YOUR AVERAGE ON THIS CASE? YES  IF YES, HOW MUCH EXTRA TIME IN MINUTES? 60-90MIN NEEDED FOR CASE  POST OP TO BE SCHEDULED AT 4 WEEKS AFTER SX APPOINTMENT LENGTH IN MINUTES 15-ONLY IF NEEDS TO DO LAPAROSCOPY, OTHERWISE SHE DOES NOT NECESSARILY NEED TO RETURN FOR VISIT

## 2024-07-22 NOTE — TELEPHONE ENCOUNTER
Type of surgery: HSC IUD RMVL w/US GUIDANCE POSS LSC  Location of surgery: [facility] OR  Date and time of surgery: 7/30/2024 11a  Surgeon: VI lawrence/Kathy  Pre-Op Appt Date: HOSPITAL  Post-Op Appt Date: 8/29/2024 3:45 (pt to CX if LSC is not done)   Packet sent out:  EMAILED DUE TO TIME  Pre-cert/Authorization completed:   TBD  Date: 7/22/2024 Gordon lawrence/Francoise Fuller  Surgery Scheduler

## 2024-07-30 ENCOUNTER — HOSPITAL ENCOUNTER (OUTPATIENT)
Dept: ULTRASOUND IMAGING | Facility: CLINIC | Age: 34
Discharge: HOME | End: 2024-07-30
Attending: OBSTETRICS & GYNECOLOGY | Admitting: OBSTETRICS & GYNECOLOGY
Payer: COMMERCIAL

## 2024-07-30 ENCOUNTER — ANESTHESIA EVENT (OUTPATIENT)
Dept: SURGERY | Facility: CLINIC | Age: 34
End: 2024-07-30
Payer: COMMERCIAL

## 2024-07-30 ENCOUNTER — HOSPITAL ENCOUNTER (OUTPATIENT)
Facility: CLINIC | Age: 34
Discharge: HOME | End: 2024-07-30
Attending: OBSTETRICS & GYNECOLOGY | Admitting: OBSTETRICS & GYNECOLOGY
Payer: COMMERCIAL

## 2024-07-30 ENCOUNTER — ANESTHESIA (OUTPATIENT)
Dept: SURGERY | Facility: CLINIC | Age: 34
End: 2024-07-30
Payer: COMMERCIAL

## 2024-07-30 VITALS
BODY MASS INDEX: 26.81 KG/M2 | WEIGHT: 181 LBS | RESPIRATION RATE: 14 BRPM | OXYGEN SATURATION: 97 % | HEART RATE: 40 BPM | DIASTOLIC BLOOD PRESSURE: 57 MMHG | TEMPERATURE: 97.7 F | SYSTOLIC BLOOD PRESSURE: 92 MMHG | HEIGHT: 69 IN

## 2024-07-30 DIAGNOSIS — Z97.5 IUD (INTRAUTERINE DEVICE) IN PLACE: ICD-10-CM

## 2024-07-30 DIAGNOSIS — T83.32XA MALPOSITIONED INTRAUTERINE DEVICE: ICD-10-CM

## 2024-07-30 DIAGNOSIS — Z98.890 STATUS POST HYSTEROSCOPY: ICD-10-CM

## 2024-07-30 DIAGNOSIS — T83.32XD MALPOSITIONED INTRAUTERINE DEVICE (IUD), SUBSEQUENT ENCOUNTER: Primary | ICD-10-CM

## 2024-07-30 LAB
BASOPHILS # BLD AUTO: 0.1 10E3/UL (ref 0–0.2)
BASOPHILS NFR BLD AUTO: 1 %
EOSINOPHIL # BLD AUTO: 0.1 10E3/UL (ref 0–0.7)
EOSINOPHIL NFR BLD AUTO: 2 %
ERYTHROCYTE [DISTWIDTH] IN BLOOD BY AUTOMATED COUNT: 12.2 % (ref 10–15)
HCG UR QL: NEGATIVE
HCT VFR BLD AUTO: 39.3 % (ref 35–47)
HGB BLD-MCNC: 13 G/DL (ref 11.7–15.7)
IMM GRANULOCYTES # BLD: 0 10E3/UL
IMM GRANULOCYTES NFR BLD: 0 %
LYMPHOCYTES # BLD AUTO: 3 10E3/UL (ref 0.8–5.3)
LYMPHOCYTES NFR BLD AUTO: 39 %
MCH RBC QN AUTO: 30.6 PG (ref 26.5–33)
MCHC RBC AUTO-ENTMCNC: 33.1 G/DL (ref 31.5–36.5)
MCV RBC AUTO: 93 FL (ref 78–100)
MONOCYTES # BLD AUTO: 0.6 10E3/UL (ref 0–1.3)
MONOCYTES NFR BLD AUTO: 7 %
NEUTROPHILS # BLD AUTO: 3.9 10E3/UL (ref 1.6–8.3)
NEUTROPHILS NFR BLD AUTO: 51 %
NRBC # BLD AUTO: 0 10E3/UL
NRBC BLD AUTO-RTO: 0 /100
PLATELET # BLD AUTO: 384 10E3/UL (ref 150–450)
RBC # BLD AUTO: 4.25 10E6/UL (ref 3.8–5.2)
WBC # BLD AUTO: 7.6 10E3/UL (ref 4–11)

## 2024-07-30 PROCEDURE — 250N000011 HC RX IP 250 OP 636: Performed by: NURSE ANESTHETIST, CERTIFIED REGISTERED

## 2024-07-30 PROCEDURE — 250N000011 HC RX IP 250 OP 636: Performed by: OBSTETRICS & GYNECOLOGY

## 2024-07-30 PROCEDURE — 58562 HYSTEROSCOPY REMOVE FB: CPT | Performed by: ANESTHESIOLOGY

## 2024-07-30 PROCEDURE — 272N000002 HC OR SUPPLY OTHER OPNP: Performed by: OBSTETRICS & GYNECOLOGY

## 2024-07-30 PROCEDURE — 85025 COMPLETE CBC W/AUTO DIFF WBC: CPT | Performed by: OBSTETRICS & GYNECOLOGY

## 2024-07-30 PROCEDURE — 258N000003 HC RX IP 258 OP 636: Performed by: ANESTHESIOLOGY

## 2024-07-30 PROCEDURE — 250N000009 HC RX 250: Performed by: OBSTETRICS & GYNECOLOGY

## 2024-07-30 PROCEDURE — 370N000017 HC ANESTHESIA TECHNICAL FEE, PER MIN: Performed by: OBSTETRICS & GYNECOLOGY

## 2024-07-30 PROCEDURE — 360N000077 HC SURGERY LEVEL 4, PER MIN: Performed by: OBSTETRICS & GYNECOLOGY

## 2024-07-30 PROCEDURE — 710N000012 HC RECOVERY PHASE 2, PER MINUTE: Performed by: OBSTETRICS & GYNECOLOGY

## 2024-07-30 PROCEDURE — 272N000001 HC OR GENERAL SUPPLY STERILE: Performed by: OBSTETRICS & GYNECOLOGY

## 2024-07-30 PROCEDURE — 250N000011 HC RX IP 250 OP 636: Performed by: ANESTHESIOLOGY

## 2024-07-30 PROCEDURE — 250N000009 HC RX 250: Performed by: NURSE ANESTHETIST, CERTIFIED REGISTERED

## 2024-07-30 PROCEDURE — 81025 URINE PREGNANCY TEST: CPT | Performed by: OBSTETRICS & GYNECOLOGY

## 2024-07-30 PROCEDURE — 58562 HYSTEROSCOPY REMOVE FB: CPT | Performed by: NURSE ANESTHETIST, CERTIFIED REGISTERED

## 2024-07-30 PROCEDURE — 999N000141 HC STATISTIC PRE-PROCEDURE NURSING ASSESSMENT: Performed by: OBSTETRICS & GYNECOLOGY

## 2024-07-30 PROCEDURE — 250N000013 HC RX MED GY IP 250 OP 250 PS 637: Performed by: OBSTETRICS & GYNECOLOGY

## 2024-07-30 PROCEDURE — 36415 COLL VENOUS BLD VENIPUNCTURE: CPT | Performed by: OBSTETRICS & GYNECOLOGY

## 2024-07-30 PROCEDURE — 710N000009 HC RECOVERY PHASE 1, LEVEL 1, PER MIN: Performed by: OBSTETRICS & GYNECOLOGY

## 2024-07-30 PROCEDURE — 76998 US GUIDE INTRAOP: CPT | Mod: 26 | Performed by: OBSTETRICS & GYNECOLOGY

## 2024-07-30 PROCEDURE — 999N000063 US INTRAOPERATIVE: Mod: TC

## 2024-07-30 PROCEDURE — 58562 HYSTEROSCOPY REMOVE FB: CPT | Performed by: OBSTETRICS & GYNECOLOGY

## 2024-07-30 RX ORDER — SODIUM CHLORIDE, SODIUM LACTATE, POTASSIUM CHLORIDE, CALCIUM CHLORIDE 600; 310; 30; 20 MG/100ML; MG/100ML; MG/100ML; MG/100ML
INJECTION, SOLUTION INTRAVENOUS CONTINUOUS
Status: DISCONTINUED | OUTPATIENT
Start: 2024-07-30 | End: 2024-07-30 | Stop reason: HOSPADM

## 2024-07-30 RX ORDER — LIDOCAINE 40 MG/G
CREAM TOPICAL
Status: DISCONTINUED | OUTPATIENT
Start: 2024-07-30 | End: 2024-07-30 | Stop reason: HOSPADM

## 2024-07-30 RX ORDER — GLYCOPYRROLATE 0.2 MG/ML
INJECTION, SOLUTION INTRAMUSCULAR; INTRAVENOUS PRN
Status: DISCONTINUED | OUTPATIENT
Start: 2024-07-30 | End: 2024-07-30

## 2024-07-30 RX ORDER — ONDANSETRON 2 MG/ML
INJECTION INTRAMUSCULAR; INTRAVENOUS PRN
Status: DISCONTINUED | OUTPATIENT
Start: 2024-07-30 | End: 2024-07-30

## 2024-07-30 RX ORDER — IBUPROFEN 800 MG/1
800 TABLET, FILM COATED ORAL EVERY 8 HOURS PRN
Qty: 90 TABLET | Refills: 0 | Status: SHIPPED | OUTPATIENT
Start: 2024-07-30 | End: 2024-09-23

## 2024-07-30 RX ORDER — ONDANSETRON 2 MG/ML
4 INJECTION INTRAMUSCULAR; INTRAVENOUS EVERY 30 MIN PRN
Status: DISCONTINUED | OUTPATIENT
Start: 2024-07-30 | End: 2024-07-30 | Stop reason: HOSPADM

## 2024-07-30 RX ORDER — HYDROMORPHONE HCL IN WATER/PF 6 MG/30 ML
0.4 PATIENT CONTROLLED ANALGESIA SYRINGE INTRAVENOUS EVERY 5 MIN PRN
Status: DISCONTINUED | OUTPATIENT
Start: 2024-07-30 | End: 2024-07-30 | Stop reason: HOSPADM

## 2024-07-30 RX ORDER — LIDOCAINE HYDROCHLORIDE 20 MG/ML
INJECTION, SOLUTION INFILTRATION; PERINEURAL PRN
Status: DISCONTINUED | OUTPATIENT
Start: 2024-07-30 | End: 2024-07-30

## 2024-07-30 RX ORDER — ONDANSETRON 4 MG/1
4 TABLET, ORALLY DISINTEGRATING ORAL EVERY 30 MIN PRN
Status: DISCONTINUED | OUTPATIENT
Start: 2024-07-30 | End: 2024-07-30 | Stop reason: HOSPADM

## 2024-07-30 RX ORDER — PROPOFOL 10 MG/ML
INJECTION, EMULSION INTRAVENOUS PRN
Status: DISCONTINUED | OUTPATIENT
Start: 2024-07-30 | End: 2024-07-30

## 2024-07-30 RX ORDER — KETOROLAC TROMETHAMINE 15 MG/ML
15 INJECTION, SOLUTION INTRAMUSCULAR; INTRAVENOUS ONCE
Status: COMPLETED | OUTPATIENT
Start: 2024-07-30 | End: 2024-07-30

## 2024-07-30 RX ORDER — CALCIUM CARBONATE/VITAMIN D3 500-10/5ML
1 LIQUID (ML) ORAL DAILY
COMMUNITY

## 2024-07-30 RX ORDER — BUPIVACAINE HYDROCHLORIDE 2.5 MG/ML
INJECTION, SOLUTION INFILTRATION; PERINEURAL PRN
Status: DISCONTINUED | OUTPATIENT
Start: 2024-07-30 | End: 2024-07-30 | Stop reason: HOSPADM

## 2024-07-30 RX ORDER — FENTANYL CITRATE 0.05 MG/ML
25 INJECTION, SOLUTION INTRAMUSCULAR; INTRAVENOUS EVERY 5 MIN PRN
Status: DISCONTINUED | OUTPATIENT
Start: 2024-07-30 | End: 2024-07-30 | Stop reason: HOSPADM

## 2024-07-30 RX ORDER — NALOXONE HYDROCHLORIDE 0.4 MG/ML
0.1 INJECTION, SOLUTION INTRAMUSCULAR; INTRAVENOUS; SUBCUTANEOUS
Status: DISCONTINUED | OUTPATIENT
Start: 2024-07-30 | End: 2024-07-30 | Stop reason: HOSPADM

## 2024-07-30 RX ORDER — DEXAMETHASONE SODIUM PHOSPHATE 4 MG/ML
INJECTION, SOLUTION INTRA-ARTICULAR; INTRALESIONAL; INTRAMUSCULAR; INTRAVENOUS; SOFT TISSUE PRN
Status: DISCONTINUED | OUTPATIENT
Start: 2024-07-30 | End: 2024-07-30

## 2024-07-30 RX ORDER — PROPOFOL 10 MG/ML
INJECTION, EMULSION INTRAVENOUS CONTINUOUS PRN
Status: DISCONTINUED | OUTPATIENT
Start: 2024-07-30 | End: 2024-07-30

## 2024-07-30 RX ORDER — ACETAMINOPHEN 500 MG
1000 TABLET ORAL EVERY 6 HOURS PRN
Qty: 90 TABLET | Refills: 0 | Status: SHIPPED | OUTPATIENT
Start: 2024-07-30 | End: 2024-09-23

## 2024-07-30 RX ORDER — ACETAMINOPHEN 325 MG/1
975 TABLET ORAL ONCE
Status: COMPLETED | OUTPATIENT
Start: 2024-07-30 | End: 2024-07-30

## 2024-07-30 RX ORDER — FENTANYL CITRATE 0.05 MG/ML
50 INJECTION, SOLUTION INTRAMUSCULAR; INTRAVENOUS EVERY 5 MIN PRN
Status: DISCONTINUED | OUTPATIENT
Start: 2024-07-30 | End: 2024-07-30 | Stop reason: HOSPADM

## 2024-07-30 RX ORDER — FENTANYL CITRATE 50 UG/ML
INJECTION, SOLUTION INTRAMUSCULAR; INTRAVENOUS PRN
Status: DISCONTINUED | OUTPATIENT
Start: 2024-07-30 | End: 2024-07-30

## 2024-07-30 RX ORDER — HYDROMORPHONE HCL IN WATER/PF 6 MG/30 ML
0.2 PATIENT CONTROLLED ANALGESIA SYRINGE INTRAVENOUS EVERY 5 MIN PRN
Status: DISCONTINUED | OUTPATIENT
Start: 2024-07-30 | End: 2024-07-30 | Stop reason: HOSPADM

## 2024-07-30 RX ADMIN — ONDANSETRON 4 MG: 2 INJECTION INTRAMUSCULAR; INTRAVENOUS at 11:33

## 2024-07-30 RX ADMIN — PROPOFOL 20 MG: 10 INJECTION, EMULSION INTRAVENOUS at 11:52

## 2024-07-30 RX ADMIN — SODIUM CHLORIDE, POTASSIUM CHLORIDE, SODIUM LACTATE AND CALCIUM CHLORIDE: 600; 310; 30; 20 INJECTION, SOLUTION INTRAVENOUS at 09:51

## 2024-07-30 RX ADMIN — KETOROLAC TROMETHAMINE 15 MG: 15 INJECTION, SOLUTION INTRAMUSCULAR; INTRAVENOUS at 12:47

## 2024-07-30 RX ADMIN — MIDAZOLAM 2 MG: 1 INJECTION INTRAMUSCULAR; INTRAVENOUS at 11:15

## 2024-07-30 RX ADMIN — PROPOFOL 170 MCG/KG/MIN: 10 INJECTION, EMULSION INTRAVENOUS at 11:15

## 2024-07-30 RX ADMIN — ACETAMINOPHEN 975 MG: 325 TABLET, FILM COATED ORAL at 09:51

## 2024-07-30 RX ADMIN — LIDOCAINE HYDROCHLORIDE 100 MG: 20 INJECTION, SOLUTION INFILTRATION; PERINEURAL at 11:15

## 2024-07-30 RX ADMIN — DEXAMETHASONE SODIUM PHOSPHATE 4 MG: 4 INJECTION, SOLUTION INTRA-ARTICULAR; INTRALESIONAL; INTRAMUSCULAR; INTRAVENOUS; SOFT TISSUE at 11:20

## 2024-07-30 RX ADMIN — GLYCOPYRROLATE 0.2 MG: 0.2 INJECTION, SOLUTION INTRAMUSCULAR; INTRAVENOUS at 11:35

## 2024-07-30 RX ADMIN — FENTANYL CITRATE 25 MCG: 50 INJECTION INTRAMUSCULAR; INTRAVENOUS at 11:53

## 2024-07-30 RX ADMIN — FENTANYL CITRATE 25 MCG: 50 INJECTION INTRAMUSCULAR; INTRAVENOUS at 11:19

## 2024-07-30 ASSESSMENT — LIFESTYLE VARIABLES: TOBACCO_USE: 0

## 2024-07-30 ASSESSMENT — ENCOUNTER SYMPTOMS
SEIZURES: 0
DYSRHYTHMIAS: 0

## 2024-07-30 ASSESSMENT — ACTIVITIES OF DAILY LIVING (ADL)
ADLS_ACUITY_SCORE: 35
ADLS_ACUITY_SCORE: 35
ADLS_ACUITY_SCORE: 33
ADLS_ACUITY_SCORE: 35

## 2024-07-30 NOTE — OP NOTE
HYSTEROSCOPIC IUD REMOVAL OPERATIVE NOTE    Preoperative Diagnosis: Malpositioned IUD with arm embedded into myometrium  Postoperative Diagnosis: same  Procedure(s): Hysteroscopic IUD removal with ultrasound guidance  Surgeon: BRENNEN Sanchez DO  Type of anesthesia:  MAC  Complications: None  EBL:  5 cc  IV Fluids: 500cc  Fluid deficit: 445 cc  Findings: IUD turned sideways and top arm embedded into anterior TYRON almost the entire length of the IUD arm. Normal uterine cavity. IUD removed intact  Specimen(s) removed: none (IUD discarded)    Procedure:   The patient was taken to the operating room where MAC anesthesia was administered. She was prepared and draped in the normal sterile fashion in the dorsal lithotomy position. A bivalve speculum was inserted in the vagina. Marcaine quarter percent without epi injected into the cervix. A single tooth tenaculum was used to grasp the anterior lip of the cervix. IUD string visualized. The javier dilators were used to dilate the cervix.  Ultrasound in place able to vizualize the arm extended into the myometrium. The hysteroscope was inserted and the uterine cavity explored. The above findings were noted. Hysteroscopic graspers used to grasp the IUD arm and remove it intact. IUD removed intact. Normal cavity and cervix post. All instruments were then removed. Tenaculum sites appeared to be hemostatic with pressure and silver nitrate. The patient tolerated the procedure well and was transferred in stable condition to the PACU.     Marta Sanchez DO  July 30, 2024  12:19 PM

## 2024-07-30 NOTE — ANESTHESIA PREPROCEDURE EVALUATION
Anesthesia Pre-Procedure Evaluation    Patient: Meredith Dean   MRN: [id number] : 1990        Procedure : Procedure(s):  HYSTEROSCOPIC INTRAUTERINE DEVICE REMOVAL WITH ULTRASOUND GUIDANCE,  POSSIBLE LAPAROSCOPY          Past Medical History:   Diagnosis Date    Dupuytren contracture     Migraine       Past Surgical History:   Procedure Laterality Date    FINGER SURGERY Left     Fasciectomy x 2    INSERT INTRAUTERINE DEVICE  2023    Mirena IUD    LABIALPLASTY      VULVA SURGERY      wisdom teeth        No Known Allergies   Social History     Tobacco Use    Smoking status: Never    Smokeless tobacco: Never   Substance Use Topics    Alcohol use: Yes      Wt Readings from Last 1 Encounters:   23 83.9 kg (185 lb)        Anesthesia Evaluation   Pt has had prior anesthetic.     No history of anesthetic complications       ROS/MED HX  ENT/Pulmonary:    (-) tobacco use and asthma   Neurologic:    (-) no seizures, no CVA and no TIA   Cardiovascular:    (-) PIH and arrhythmias   METS/Exercise Tolerance: >4 METS    Hematologic:    (-) anemia   Musculoskeletal:       GI/Hepatic:    (-) GERD and liver disease   Renal/Genitourinary:    (-) renal disease   Endo:     (+)               Obesity (BMI 32),    (-) Type II DM   Psychiatric/Substance Use:       Infectious Disease:    (-) Recent Fever   Malignancy:       Other: Comment: +breastfeeding             Physical Exam    Airway        Mallampati: I   TM distance: > 3 FB   Neck ROM: full   Mouth opening: > 3 cm    Respiratory Devices and Support         Dental       (+) Completely normal teeth      Cardiovascular          Rhythm and rate: regular and normal     Pulmonary           breath sounds clear to auscultation           OUTSIDE LABS:  CBC:   Lab Results   Component Value Date    WBC 16.1 (H) 2023    WBC 15.4 (H) 2023    HGB 12.5 2023    HGB 11.3 (L) 2023    HCT 37.2 2023    HCT 34.3 (L) 2023     2023    PLT  "338 07/03/2023     BMP: No results found for: \"NA\", \"POTASSIUM\", \"CHLORIDE\", \"CO2\", \"BUN\", \"CR\", \"GLC\"  COAGS: No results found for: \"PTT\", \"INR\", \"FIBR\"  POC:   Lab Results   Component Value Date    HCG Negative 11/21/2023     HEPATIC: No results found for: \"ALBUMIN\", \"PROTTOTAL\", \"ALT\", \"AST\", \"GGT\", \"ALKPHOS\", \"BILITOTAL\", \"BILIDIRECT\", \"RICHARD\"  OTHER: No results found for: \"PH\", \"LACT\", \"A1C\", \"SHERLYN\", \"PHOS\", \"MAG\", \"LIPASE\", \"AMYLASE\", \"TSH\", \"T4\", \"T3\", \"CRP\", \"SED\"    Anesthesia Plan    ASA Status:  1    NPO Status:  NPO Appropriate    Anesthesia Type: MAC.     - Reason for MAC: straight local not clinically adequate      Maintenance: TIVA.        Consents    Anesthesia Plan(s) and associated risks, benefits, and realistic alternatives discussed. Questions answered and patient/representative(s) expressed understanding.     - Discussed: Risks, Benefits and Alternatives for the PROCEDURE were discussed     - Discussed with:  Patient, Spouse            Postoperative Care    Pain management: IV analgesics, Oral pain medications, Multi-modal analgesia.   PONV prophylaxis: Ondansetron (or other 5HT-3), Dexamethasone or Solumedrol     Comments:    Other Comments: Plan for MAC, convert to GETA if laparoscopy needed.            Kasey Bautista MD    I have reviewed the pertinent notes and labs in the chart from the past 30 days and (re)examined the patient.  Any updates or changes from those notes are reflected in this note.                  "

## 2024-07-30 NOTE — OR NURSING
Assuming patient care.  Hand-off report received per Adelaida DEVINE RN.   mild leukocytosis (?reactive to fall); afebrile and hds otherwise  prior uc have grown pan sensitive e.coli + p.mirabilis   ua w bacteria, no nitrites + pyuria w le  s/p vanc and zosyn in er  follow up uc; freq bladder scan as needed  monitor for fever, changes in white count  start empirical broad spec abx coverage w rocephin  antipyretics, analgesics, antiemetics as needed h/o hfpef, vhd (mod as, mod mr and tr)  no clinft of acs; ekg shows nsr, lvh w non specific st seg - t wave changes that are likely repolarization changes; trop 721->697 - suspect 2/2 increased demand iso decreased clearance  clinically w volume overload; cxr shows mild pulm vasc mariza; bnp ~30k; prev tte showed Lvef 50-55%, grade 2 diastolic dysfunction, normal lv systolic function, mod mr + tr, mod as  s/p lasix 40 ivp in er  -follow up tte-may need cardiology consult pending results  -monitor for chest pain, telemetry/ekg changes  -trend volume status via i/o and daily weights  -approaching euvolemia -> decrease lasix 40 iv daily, monitor response

## 2024-07-30 NOTE — DISCHARGE INSTRUCTIONS
** Today you received Toradol, an antiinflammatory medication similar to Ibuprofen.  You should not take other antiinflammatory medication, such as Ibuprofen, Motrin, Advil, Aleve, Naprosyn, etc until 7:00 pm tonight. **      ** Today you were given 975 mg of Tylenol at 10 am. The recommended daily maximum dose is 4000 mg. **          Same Day Surgery Discharge Instructions     It's not unusual to feel dizzy, light-headed or faint for up to 24 hours after surgery or while taking pain medication.  If you have these symptoms: sit for a few minutes before standing and have someone assist you when you get up to walk or use the bathroom.    You should rest and relax for the next 24 hours. You must make arrangements to have someone stay with you for at least 24 hours after your discharge.  Avoid hazardous and strenuous activity.    DO NOT DRIVE any vehicle or operate mechanical equipment for 24 hours following the end of your surgery.  Even though you may feel normal, your reactions may be affected by the medication you have received. Do not drive while taking narcotic pain meds (for example vicodin or percocet).    Do not drink alcoholic beverages for 24 hours following surgery.     It's not unusual to feel nauseated and/or vomit after receiving anesthesia.  If you develop these symptoms, drink clear liquids (apple juice, ginger ale, broth, 7-up, etc. ) until you feel better.  Slowly progress to your regular diet as you feel able.  If your nausea and vomiting persists for 24 hours, please notify your surgeon.      All narcotic pain medications, along with inactivity and anesthesia, can cause constipation. Drinking plenty of liquids and increasing fiber intake will help.     For any questions of a medical nature, call your surgeon.    Do not make important decisions for 24 hours.    If you had general anesthesia, you may have a sore throat for a couple of days related to the breathing tube used during surgery.  You may  use Cepacol lozenges to help with this discomfort.  If it worsens or if you develop a fever, contact your surgeon.     If you feel your pain is not well managed with the pain medications prescribed by your surgeon, please contact your surgeon's office to let them know so they can address your concerns.           HOME CARE FOLLOWING SURGERY    MEDICATIONS:  -May take Ibuprofen (800mg by mouth every 8 hours as needed for pain) or tylenol (500mg by mouth every 6 hours as needed for pain; max 4000mg per day) when at home as needed for pain/cramps/headaches/pain, follow instructions on bottle.  -You may use miralax or docusate (one tab by mouth twice daily) for stool softening, these are over the counter and can be found at any pharmacy. Follow bottle instructions.    Diet  You have no restrictions on your diet.  During the evening following surgery, drink plenty of fluids and eat a light supper.    Nausea  The anesthesia may produce some nausea.  If you feel nauseated, stay in bed and try drinking fluids such as 7-Up, tea, or soup.    Discomfort  The amount of discomfort you can expect is very unpredictable.  If you have pain that cannot be controlled with Tylenol, Ibuprofen or with the prescription you may have received, you should notify your physician.   Abdominal cramping or low backache is not uncommon and should not be a cause for concern.  You will be drowsy and weak the day of surgery and possibly the following day.  You may experience light bleeding, cramping, discharge for 1-2 weeks. This should gradually improve over time.    Fever  A low grade fever (not over 100.4 degree Fahrenheit) is usual after this procedure.  Do not hesitate to notify your physician if your fever seems excessive or persists.    Activity   You may resume your normal activity.  Avoid heavy lifting for one week.    You may shower.  Do not douche, use tampons, or resume intercourse for 2-4 weeks.    Emergency Care  Contact your physician  if you have any of these problems:   1.  A fever over 100.4 degree Fahrenheit   2.  A large amount of bleeding or drainage   3.  Severe pain              4.  Foul vaginal discharge    Follow Up  As needed-can reschedule your postop or cancel if desired-If you are considering having another IUD placed, I would suggest we do this under ultrasound guidance. Would wait at least one month before placing another IUD.         ** If you have questions or concerns about your procedure,   call Dr. Sanchez at  [phone] **

## 2024-07-30 NOTE — ANESTHESIA POSTPROCEDURE EVALUATION
Patient: Meredith Dean    Procedure: Procedure(s):  HYSTEROSCOPIC INTRAUTERINE DEVICE REMOVAL WITH ULTRASOUND GUIDANCE,       Anesthesia Type:  MAC    Note:  Disposition: Outpatient   Postop Pain Control: Uneventful            Sign Out: Well controlled pain   PONV: No   Neuro/Psych: Uneventful            Sign Out: Acceptable/Baseline neuro status   Airway/Respiratory: Uneventful            Sign Out: Acceptable/Baseline resp. status   CV/Hemodynamics: Uneventful            Sign Out: Acceptable CV status; No obvious hypovolemia; No obvious fluid overload   Other NRE: NONE   DID A NON-ROUTINE EVENT OCCUR? No           Last vitals:  Vitals Value Taken Time   BP 91/58 07/30/24 1300   Temp 36.5  C (97.7  F) 07/30/24 1300   Pulse 53 07/30/24 1307   Resp 29 07/30/24 1307   SpO2 99 % 07/30/24 1307   Vitals shown include unfiled device data.    Electronically Signed By: Kasey Bautista MD  July 30, 2024  3:51 PM

## 2024-07-30 NOTE — ANESTHESIA CARE TRANSFER NOTE
Patient: Meredith Dean    Procedure: Procedure(s):  HYSTEROSCOPIC INTRAUTERINE DEVICE REMOVAL WITH ULTRASOUND GUIDANCE,       Diagnosis: Malpositioned intrauterine device, subsequent encounter [T83.32XD]  Diagnosis Additional Information: No value filed.    Anesthesia Type:   MAC     Note:    Oropharynx: oropharynx clear of all foreign objects  Level of Consciousness: awake  Oxygen Supplementation: room air    Independent Airway: airway patency satisfactory and stable  Dentition: dentition unchanged  Vital Signs Stable: post-procedure vital signs reviewed and stable  Report to RN Given: handoff report given  Patient transferred to: Phase II    Handoff Report: Identifed the Patient, Identified the Reponsible Provider, Reviewed the pertinent medical history, Discussed the surgical course, Reviewed Intra-OP anesthesia mangement and issues during anesthesia, Set expectations for post-procedure period and Allowed opportunity for questions and acknowledgement of understanding      Vitals:  Vitals Value Taken Time   BP 93/57 07/30/24 1208   Temp     Pulse 47 07/30/24 1212   Resp 11 07/30/24 1212   SpO2 98 % 07/30/24 1212   Vitals shown include unfiled device data.    Electronically Signed By: GHULAM Coats CRNA  July 30, 2024  12:13 PM

## 2024-09-01 ENCOUNTER — MYC REFILL (OUTPATIENT)
Dept: MIDWIFE SERVICES | Facility: CLINIC | Age: 34
End: 2024-09-01
Payer: COMMERCIAL

## 2024-09-01 DIAGNOSIS — B00.9 HSV INFECTION: ICD-10-CM

## 2024-09-03 RX ORDER — VALACYCLOVIR HYDROCHLORIDE 500 MG/1
500 TABLET, FILM COATED ORAL DAILY
Qty: 90 TABLET | Refills: 0 | Status: SHIPPED | OUTPATIENT
Start: 2024-09-03

## 2024-09-03 NOTE — TELEPHONE ENCOUNTER
Requested Prescriptions   Pending Prescriptions Disp Refills    valACYclovir (VALTREX) 500 MG tablet 90 tablet 3     Sig: Take 1 tablet (500 mg) by mouth daily.       Antivirals Passed - 9/1/2024  7:15 AM        Passed - Patient is age 12 or older        Passed - Medication is active on med list        Passed - Recent (12 mo) or future (90 days) visit within the authorizing provider's specialty     The patient must have completed an in-person or virtual visit within the past 12 months or has a future visit scheduled within the next 90 days with the authorizing provider s specialty.  Urgent care and e-visits do not quality as an office visit for this protocol.          Passed - Medication indicated for associated diagnosis     Medication is associated with one or more of the following diagnoses:     Genital herpes simplex   Herpes simples   Herpes zoster, shingles   Varicella   Recurrent herpes simplex labialis   HIV infection   Varicella-zoster virus infection, prophylaxis             Last Written Prescription Date:  11/22/23  Last Fill Quantity: 90,  # refills: 3   Last office visit: 11/22/2023 ; last virtual visit: 10/26/2023 with prescribing provider:  JACLYN Sánchez CNM   Future Office Visit:      Prescription approved per [facility] Refill Protocol.  Flor Demarco RN on 9/3/2024 at 10:44 AM

## 2024-09-18 ENCOUNTER — ALLIED HEALTH/NURSE VISIT (OUTPATIENT)
Dept: FAMILY MEDICINE | Facility: CLINIC | Age: 34
End: 2024-09-18
Payer: COMMERCIAL

## 2024-09-18 DIAGNOSIS — Z23 ENCOUNTER FOR IMMUNIZATION: Primary | ICD-10-CM

## 2024-09-18 PROCEDURE — 90480 ADMN SARSCOV2 VAC 1/ONLY CMP: CPT

## 2024-09-18 PROCEDURE — 91320 SARSCV2 VAC 30MCG TRS-SUC IM: CPT

## 2024-09-18 PROCEDURE — 99207 PR NO CHARGE NURSE ONLY: CPT

## 2024-09-18 NOTE — PROGRESS NOTES
Prior to immunization administration, verified patients identity using patient s name and date of birth. Please see Immunization Activity for additional information.     Is the patient's temperature normal (100.5 or less)? Yes     Patient MEETS CRITERIA. PROCEED with vaccine administration.      Patient instructed to remain in clinic for 15 minutes afterwards, and to report any adverse reactions.      Link to Ancillary Visit Immunization Standing Orders SmartSet     Screening performed by Zena Whiteside MA on 9/18/2024 at 11:34 AM.

## 2024-09-23 ENCOUNTER — VIRTUAL VISIT (OUTPATIENT)
Dept: FAMILY MEDICINE | Facility: CLINIC | Age: 34
End: 2024-09-23
Payer: COMMERCIAL

## 2024-09-23 DIAGNOSIS — F41.1 GAD (GENERALIZED ANXIETY DISORDER): Primary | ICD-10-CM

## 2024-09-23 PROBLEM — Z97.5 IUD (INTRAUTERINE DEVICE) IN PLACE: Status: RESOLVED | Noted: 2023-11-21 | Resolved: 2024-09-23

## 2024-09-23 PROCEDURE — 99213 OFFICE O/P EST LOW 20 MIN: CPT | Mod: 95 | Performed by: PHYSICIAN ASSISTANT

## 2024-09-23 RX ORDER — BUPROPION HYDROCHLORIDE 150 MG/1
150 TABLET ORAL EVERY MORNING
Qty: 90 TABLET | Refills: 1 | Status: SHIPPED | OUTPATIENT
Start: 2024-09-23

## 2024-09-23 ASSESSMENT — ANXIETY QUESTIONNAIRES
GAD7 TOTAL SCORE: 7
7. FEELING AFRAID AS IF SOMETHING AWFUL MIGHT HAPPEN: NOT AT ALL

## 2024-09-23 ASSESSMENT — PATIENT HEALTH QUESTIONNAIRE - PHQ9
SUM OF ALL RESPONSES TO PHQ QUESTIONS 1-9: 11
SUM OF ALL RESPONSES TO PHQ QUESTIONS 1-9: 11

## 2024-09-23 NOTE — PROGRESS NOTES
"Meredith is a 33 year old who is being evaluated via a billable video visit.    How would you like to obtain your AVS? MyChart  If the video visit is dropped, the invitation should be resent by: Text to cell phone: [phone]  Will anyone else be joining your video visit? No      Assessment & Plan     NARGIS (generalized anxiety disorder)  Long standing, chronic, UNcontrolled- ready to restart medication; prescription for wellbutrin XR sent to pharmacy with options and alternatives discussed with patient at length; over the counter and supportive care discussed with patient as well and she will look into therapy options through insurance/'s system. Return to clinic with any worsening or changes in symptoms and follow up with PCP for routine care.   - CBC with Platelets & Differential; Future  - Ferritin; Future  - Iron & Iron Binding Capacity; Future  - TSH with free T4 reflex; Future  - Vitamin D Deficiency; Future  - buPROPion (WELLBUTRIN XL) 150 MG 24 hr tablet; Take 1 tablet (150 mg) by mouth every morning.      BMI  Estimated body mass index is 26.73 kg/m  as calculated from the following:    Height as of 7/30/24: 1.753 m (5' 9\").    Weight as of 7/30/24: 82.1 kg (181 lb).   Weight management plan: Discussed healthy diet and exercise guidelines    Depression Screening Follow Up        9/23/2024    11:54 AM   PHQ   PHQ-9 Total Score 11   Q9: Thoughts of better off dead/self-harm past 2 weeks Not at all         9/23/2024    11:54 AM   Last PHQ-9   1.  Little interest or pleasure in doing things 1   2.  Feeling down, depressed, or hopeless 2   3.  Trouble falling or staying asleep, or sleeping too much 1   4.  Feeling tired or having little energy 3   5.  Poor appetite or overeating 1   6.  Feeling bad about yourself 1   7.  Trouble concentrating 1   8.  Moving slowly or restless 1   Q9: Thoughts of better off dead/self-harm past 2 weeks 0   PHQ-9 Total Score 11       Follow Up Actions Taken  Mental Health " Referral placed       See Patient Instructions    Shawn Harper is a 33 year old, presenting for the following health issues:  No chief complaint on file.    History of Present Illness       Mental Health Follow-up:  Patient presents to follow-up on Depression.Patient's depression since last visit has been:  Worse  The patient is having other symptoms associated with depression.      Any significant life events: relationship concerns and other  Patient is not feeling anxious or having panic attacks.  Patient has no concerns about alcohol or drug use.   She is taking medications regularly.    Patient notes still feels like she's struggling with postpartum with plans to stop breast feeding in the next few weeks as well.  History of symptoms seemingly worsening especially ~ 4 months ago.  History of being on wellbutrin when she got  and has worked with a therapist.    Patient is a PA at  as well.      Review of Systems  Constitutional, HEENT, cardiovascular, pulmonary, gi and gu systems are negative, except as otherwise noted.      Objective    Vitals - Patient Reported  Weight (Patient Reported): 83 kg (183 lb)        Physical Exam   GENERAL: alert and no distress  EYES: Eyes grossly normal to inspection.  No discharge or erythema, or obvious scleral/conjunctival abnormalities.  RESP: No audible wheeze, cough, or visible cyanosis.    SKIN: Visible skin clear. No significant rash, abnormal pigmentation or lesions.  NEURO: Cranial nerves grossly intact.  Mentation and speech appropriate for age.  PSYCH: Appropriate affect, tone, and pace of words          Video-Visit Details    Type of service:  Video Visit   Originating Location (pt. Location): Home    Distant Location (provider location):  Off-site  Platform used for Video Visit: Meghan  Signed Electronically by: Amando Alston PA-C

## 2024-09-23 NOTE — PATIENT INSTRUCTIONS
"Discussed the pathophysiology of anxiety/depression episodes and the various symptoms seen associated with anxiety episodes. Discussed possible triggers including fatigue, depression, stress, and chemicals such as alcohol, caffeine and certain drugs. Discussed the treatment including an aerobic exercise program, adequate rest, and both rescue meds and maintenance meds.   For your anxiety:   1. Consider therapy - CBT - cognitive behavioral therapy  2. \"The Chemistry of Calm\" by Eben Neri   3. \"Hope and Help for your Nerves\" by Ciera Stevenson   4. Vitamin D 5809-2930 IU daily  +/- L-theanine as needed  5. Valerian root extract for relaxation and sleep OR Melatonin at bedtime.    "

## 2024-09-24 ENCOUNTER — LAB (OUTPATIENT)
Dept: LAB | Facility: CLINIC | Age: 34
End: 2024-09-24
Payer: COMMERCIAL

## 2024-09-24 DIAGNOSIS — F41.1 GAD (GENERALIZED ANXIETY DISORDER): ICD-10-CM

## 2024-09-24 LAB
BASOPHILS # BLD AUTO: 0.1 10E3/UL (ref 0–0.2)
BASOPHILS NFR BLD AUTO: 1 %
EOSINOPHIL # BLD AUTO: 0.2 10E3/UL (ref 0–0.7)
EOSINOPHIL NFR BLD AUTO: 2 %
ERYTHROCYTE [DISTWIDTH] IN BLOOD BY AUTOMATED COUNT: 12.7 % (ref 10–15)
FERRITIN SERPL-MCNC: 113 NG/ML (ref 6–175)
HCT VFR BLD AUTO: 41.7 % (ref 35–47)
HGB BLD-MCNC: 13.6 G/DL (ref 11.7–15.7)
IMM GRANULOCYTES # BLD: 0 10E3/UL
IMM GRANULOCYTES NFR BLD: 0 %
IRON BINDING CAPACITY (ROCHE): 305 UG/DL (ref 240–430)
IRON SATN MFR SERPL: 47 % (ref 15–46)
IRON SERPL-MCNC: 144 UG/DL (ref 37–145)
LYMPHOCYTES # BLD AUTO: 2.9 10E3/UL (ref 0.8–5.3)
LYMPHOCYTES NFR BLD AUTO: 34 %
MCH RBC QN AUTO: 30 PG (ref 26.5–33)
MCHC RBC AUTO-ENTMCNC: 32.6 G/DL (ref 31.5–36.5)
MCV RBC AUTO: 92 FL (ref 78–100)
MONOCYTES # BLD AUTO: 0.6 10E3/UL (ref 0–1.3)
MONOCYTES NFR BLD AUTO: 7 %
NEUTROPHILS # BLD AUTO: 4.8 10E3/UL (ref 1.6–8.3)
NEUTROPHILS NFR BLD AUTO: 56 %
PLATELET # BLD AUTO: 386 10E3/UL (ref 150–450)
RBC # BLD AUTO: 4.54 10E6/UL (ref 3.8–5.2)
TSH SERPL DL<=0.005 MIU/L-ACNC: 0.99 UIU/ML (ref 0.3–4.2)
VIT D+METAB SERPL-MCNC: 54 NG/ML (ref 20–50)
WBC # BLD AUTO: 8.5 10E3/UL (ref 4–11)

## 2024-09-24 PROCEDURE — 83540 ASSAY OF IRON: CPT

## 2024-09-24 PROCEDURE — 82306 VITAMIN D 25 HYDROXY: CPT

## 2024-09-24 PROCEDURE — 85025 COMPLETE CBC W/AUTO DIFF WBC: CPT

## 2024-09-24 PROCEDURE — 84443 ASSAY THYROID STIM HORMONE: CPT

## 2024-09-24 PROCEDURE — 82728 ASSAY OF FERRITIN: CPT

## 2024-09-24 PROCEDURE — 36415 COLL VENOUS BLD VENIPUNCTURE: CPT

## 2024-09-24 PROCEDURE — 83550 IRON BINDING TEST: CPT

## 2024-09-26 NOTE — RESULT ENCOUNTER NOTE
"Armond Harper  Your attached labs are reassuringly within normal limits. Keep up the good work!  Please contact the office with any questions or concerns.    Lindsay Church \"Amando\" CHRISTIANE Alston    "

## 2024-10-28 ENCOUNTER — OFFICE VISIT (OUTPATIENT)
Dept: OBGYN | Facility: CLINIC | Age: 34
End: 2024-10-28
Attending: OBSTETRICS & GYNECOLOGY
Payer: COMMERCIAL

## 2024-10-28 ENCOUNTER — ANCILLARY PROCEDURE (OUTPATIENT)
Dept: ULTRASOUND IMAGING | Facility: CLINIC | Age: 34
End: 2024-10-28
Attending: OBSTETRICS & GYNECOLOGY
Payer: COMMERCIAL

## 2024-10-28 DIAGNOSIS — Z30.430 ENCOUNTER FOR INSERTION OF INTRAUTERINE CONTRACEPTIVE DEVICE: Primary | ICD-10-CM

## 2024-10-28 DIAGNOSIS — Z30.430 ENCOUNTER FOR IUD INSERTION: ICD-10-CM

## 2024-10-28 DIAGNOSIS — Z01.812 PRE-PROCEDURE LAB EXAM: ICD-10-CM

## 2024-10-28 LAB — HCG UR QL: NEGATIVE

## 2024-10-28 PROCEDURE — 58300 INSERT INTRAUTERINE DEVICE: CPT | Performed by: OBSTETRICS & GYNECOLOGY

## 2024-10-28 PROCEDURE — 76998 US GUIDE INTRAOP: CPT | Performed by: OBSTETRICS & GYNECOLOGY

## 2024-10-28 PROCEDURE — 81025 URINE PREGNANCY TEST: CPT | Performed by: OBSTETRICS & GYNECOLOGY

## 2024-10-28 NOTE — PROGRESS NOTES
IUD Insertion:  CONSULT:    Is a pregnancy test required: Yes.  Was it positive or negative?  Negative  Was a consent obtained?  Yes    Subjective: Meredith Dean is a 33 year old  presents for IUD and desires Mirena type IUD. This is her 5th IUD.    Patient has been given the opportunity to ask questions about all forms of birth control, including all options appropriate for Meredith Dean. Discussed that no method of birth control, except abstinence is 100% effective against pregnancy or sexually transmitted infection.     Meredith Dean understands she may have the IUD removed at any time. IUD should be removed by a health care provider.    The entire insertion procedure was reviewed with the patient, including care after placement.    Patient's last menstrual period was 2024. No allergy to betadine or shellfish.   hCG Urine Qualitative   Date Value Ref Range Status   10/28/2024 Negative Negative Final     Comment:     This test is for screening purposes.  Results should be interpreted along with the clinical picture.  Confirmation testing is available if warranted by ordering [id number], HCG Quantitative Pregnancy.         LMP 2024     Pelvic Exam:     EG/BUS: normal genital architecture without lesions, erythema or abnormal secretions.   Vagina: moist, pink, rugae with physiologic discharge and secretions  Cervix: Multiparous no lesions and pink, moist, closed, without lesion   PROCEDURE NOTE: -- IUD Insertion    Reason for Insertion: contraception    Ultrasound guidance performed for the procedure.   Under sterile technique, cervix was visualized with speculum and prepped with Betadine solution swab x 3. Tenaculum was placed for stability. The uterus was gently straightened and sounded to 7.0 cm. IUD prepared for placement, and IUD inserted according to 's instructions without difficulty or significant resitance, and deployed at the fundus. Ultrasound confirmed appropriate placement and  positioning. The strings were visualized and trimmed to 3.0 cm from the external os. Tenaculum was removed and hemostasis noted. Speculum removed.  Patient tolerated procedure well.    EBL: minimal    Complications: none    ASSESSMENT:     ICD-10-CM    1. Encounter for insertion of intrauterine contraceptive device  Z30.430 levonorgestrel (MIRENA) 52 MG (20 mcg/day) IUD 1 each     INSERTION INTRAUTERINE DEVICE      2. Pre-procedure lab exam  Z01.812 HCG qualitative urine     HCG qualitative urine             PLAN:    Given 's handouts, including when to have IUD removed, list of danger s/sx, side effects and follow up recommended. Encouraged condom use for prevention of STD. Back up contraception advised for 7 days if progestin method. Advised to call for any fever, for prolonged or severe pain or bleeding, abnormal vaginal discharge, or unable to palpate strings. She was advised to use pain medications (ibuprofen) as needed for mild to moderate pain. Advised to follow-up in clinic in 4-6 weeks for IUD string check if unable to find strings or as directed by provider.     Marta Crowe Masters, DO

## 2025-03-16 ENCOUNTER — HEALTH MAINTENANCE LETTER (OUTPATIENT)
Age: 35
End: 2025-03-16

## 2025-04-22 DIAGNOSIS — N92.6 IRREGULAR MENSES: Primary | ICD-10-CM

## 2025-04-28 ENCOUNTER — E-VISIT (OUTPATIENT)
Dept: FAMILY MEDICINE | Facility: CLINIC | Age: 35
End: 2025-04-28
Payer: COMMERCIAL

## 2025-04-28 DIAGNOSIS — J01.90 ACUTE SINUSITIS WITH SYMPTOMS > 10 DAYS: Primary | ICD-10-CM

## 2025-04-28 RX ORDER — FLUCONAZOLE 150 MG/1
150 TABLET ORAL ONCE
Qty: 1 TABLET | Refills: 0 | Status: SHIPPED | OUTPATIENT
Start: 2025-04-28 | End: 2025-04-28

## 2025-05-05 ENCOUNTER — ANCILLARY PROCEDURE (OUTPATIENT)
Dept: ULTRASOUND IMAGING | Facility: CLINIC | Age: 35
End: 2025-05-05
Attending: OBSTETRICS & GYNECOLOGY
Payer: COMMERCIAL

## 2025-05-05 DIAGNOSIS — N92.6 IRREGULAR MENSES: ICD-10-CM

## 2025-05-05 PROCEDURE — 76830 TRANSVAGINAL US NON-OB: CPT

## 2025-05-05 PROCEDURE — 76856 US EXAM PELVIC COMPLETE: CPT

## 2025-05-09 ENCOUNTER — RESULTS FOLLOW-UP (OUTPATIENT)
Dept: FAMILY MEDICINE | Facility: CLINIC | Age: 35
End: 2025-05-09

## 2025-05-09 NOTE — RESULT ENCOUNTER NOTE
"Armond Harper  Your attached labs are normal. Keep up the good work!  Please contact the office with any questions or concerns.    Lindsay Church \"Amando\" CHRISTIANE Alston      "

## 2025-05-12 ENCOUNTER — PATIENT OUTREACH (OUTPATIENT)
Dept: CARE COORDINATION | Facility: CLINIC | Age: 35
End: 2025-05-12
Payer: COMMERCIAL

## 2025-05-14 ENCOUNTER — PATIENT OUTREACH (OUTPATIENT)
Dept: CARE COORDINATION | Facility: CLINIC | Age: 35
End: 2025-05-14
Payer: COMMERCIAL

## 2025-06-02 ENCOUNTER — THERAPY VISIT (OUTPATIENT)
Dept: PHYSICAL THERAPY | Facility: CLINIC | Age: 35
End: 2025-06-02
Attending: PHYSICIAN ASSISTANT
Payer: COMMERCIAL

## 2025-06-02 DIAGNOSIS — M25.552 HIP PAIN, LEFT: ICD-10-CM

## 2025-06-02 PROCEDURE — 97161 PT EVAL LOW COMPLEX 20 MIN: CPT | Mod: GP

## 2025-06-02 PROCEDURE — 97110 THERAPEUTIC EXERCISES: CPT | Mod: GP

## 2025-06-02 ASSESSMENT — ACTIVITIES OF DAILY LIVING (ADL)
HOS_ADL_SCORE(%): 95.59
ADL_COUNT: 17
LIGHT_TO_MODERATE_WORK: NO DIFFICULTY AT ALL
PLEASE_INDICATE_YOR_PRIMARY_REASON_FOR_REFERRAL_TO_THERAPY:: HIP
STANDING_FOR_15_MINUTES: NO DIFFICULTY AT ALL
GOING_DOWN_1_FLIGHT_OF_STAIRS: NO DIFFICULTY AT ALL
ROLLING_OVER_IN_BED: NO DIFFICULTY AT ALL
WALKING_UP_STEEP_HILLS: NO DIFFICULTY AT ALL
SPORTS_TOTAL_ITEM_SCORE: 0
GETTING_INTO_AND_OUT_OF_A_BATHTUB: NO DIFFICULTY AT ALL
PUTTING_ON_SOCKS_AND_SHOES: NO DIFFICULTY AT ALL
ROLLING OVER IN BED: NO DIFFICULTY AT ALL
ADL_TOTAL_ITEM_SCORE: 0
STEPPING UP AND DOWN CURBS: NO DIFFICULTY AT ALL
SPORTS_HIGHEST_POTENTIAL_SCORE: 36
HOW_WOULD_YOU_RATE_YOUR_CURRENT_LEVEL_OF_FUNCTION_DURING_YOUR_USUAL_ACTIVITIES_OF_DAILY_LIVING_FROM_0_TO_100_WITH_100_BEING_YOUR_LEVEL_OF_FUNCTION_PRIOR_TO_YOUR_HIP_PROBLEM_AND_0_BEING_THE_INABILITY_TO_PERFORM_ANY_OF_YOUR_USUAL_DAILY_ACTIVITIES?: 100
WALKING_15_MINUTES_OR_GREATER: NO DIFFICULTY AT ALL
ADL_SCORE(%): 0
PUTTING ON SOCKS AND SHOES: NO DIFFICULTY AT ALL
DEEP SQUATTING: SLIGHT DIFFICULTY
HOW_WOULD_YOU_RATE_YOUR_CURRENT_LEVEL_OF_FUNCTION_DURING_YOUR_SPORTS_RELATED_ACTIVITIES_FROM_0_TO_100_WITH_100_BEING_YOUR_LEVEL_OF_FUNCTION_PRIOR_TO_YOUR_HIP_PROBLEM_AND_0_BEING_THE_INABILITY_TO_PERFORM_ANY_OF_YOUR_USUAL_DAILY_ACTIVITIES?: 75
WALKING_15_MINUTES_OR_GREATER: NO DIFFICULTY AT ALL
WALKING_INITIALLY: NO DIFFICULTY AT ALL
GOING DOWN 1 FLIGHT OF STAIRS: NO DIFFICULTY AT ALL
TWISTING/PIVOTING_ON_INVOLVED_LEG: NO DIFFICULTY AT ALL
STANDING FOR 15 MINUTES: NO DIFFICULTY AT ALL
LANDING: NO DIFFICULTY AT ALL
SPORTS_COUNT: 9
ABILITY_TO_PARTICIPATE_IN_YOUR_DESIRED_SPORT_AS_LONG_AS_YOU_WOULD_LIKE: MODERATE DIFFICULTY
ABILITY_TO_PERFORM_ACTIVITY_WITH_YOUR_NORMAL_TECHNIQUE: SLIGHT DIFFICULTY
WALKING_DOWN_STEEP_HILLS: NO DIFFICULTY AT ALL
WALKING_FOR_APPROXIMATELY_10_MINUTES: NO DIFFICULTY AT ALL
SITTING FOR 15 MINUTES: NO DIFFICULTY AT ALL
HOS_ADL_ITEM_SCORE_TOTAL: 65
GETTING_INTO_AND_OUT_OF_AN_AVERAGE_CAR: NO DIFFICULTY AT ALL
SWINGING_OBJECTS_LIKE_A_GOLF_CLUB: NO DIFFICULTY AT ALL
RECREATIONAL_ACTIVITIES: SLIGHT DIFFICULTY
GETTING INTO AND OUT OF AN AVERAGE CAR: NO DIFFICULTY AT ALL
RECREATIONAL ACTIVITIES: SLIGHT DIFFICULTY
GOING_UP_1_FLIGHT_OF_STAIRS: NO DIFFICULTY AT ALL
LIGHT_TO_MODERATE_WORK: NO DIFFICULTY AT ALL
HEAVY_WORK: SLIGHT DIFFICULTY
GOING UP 1 FLIGHT OF STAIRS: NO DIFFICULTY AT ALL
WALKING_INITIALLY: NO DIFFICULTY AT ALL
STEPPING_UP_AND_DOWN_CURBS: NO DIFFICULTY AT ALL
STARTING_AND_STOPPING_QUICKLY: NO DIFFICULTY AT ALL
JUMPING: NO DIFFICULTY AT ALL
CUTTING/LATERAL_MOVEMENTS: NO DIFFICULTY AT ALL
TWISTING/PIVOTING ON INVOLVED LEG: NO DIFFICULTY AT ALL
WALKING_DOWN_STEEP_HILLS: NO DIFFICULTY AT ALL
HOS_ADL_HIGHEST_POTENTIAL_SCORE: 68
GETTING_INTO_AND_OUT_OF_A_BATHTUB: NO DIFFICULTY AT ALL
HOW_WOULD_YOU_RATE_YOUR_CURRENT_LEVEL_OF_FUNCTION?: NEARLY NORMAL
LOW_IMPACT_ACTIVITIES_LIKE_FAST_WALKING: NO DIFFICULTY AT ALL
SITTING_FOR_15_MINUTES: NO DIFFICULTY AT ALL
RUNNING_ONE_MILE: NO DIFFICULTY AT ALL
HOW_WOULD_YOU_RATE_YOUR_CURRENT_LEVEL_OF_FUNCTION_DURING_YOUR_USUAL_ACTIVITIES_OF_DAILY_LIVING_FROM_0_TO_100_WITH_100_BEING_YOUR_LEVEL_OF_FUNCTION_PRIOR_TO_YOUR_HIP_PROBLEM_AND_0_BEING_THE_INABILITY_TO_PERFORM_ANY_OF_YOUR_USUAL_DAILY_ACTIVITIES?: 100
ADL_HIGHEST_POTENTIAL_SCORE: 68
DEEP_SQUATTING: SLIGHT DIFFICULTY
SPORTS_SCORE(%): 0
HEAVY_WORK: SLIGHT DIFFICULTY
WALKING_UP_STEEP_HILLS: NO DIFFICULTY AT ALL
WALKING_APPROXIMATELY_10_MINUTES: NO DIFFICULTY AT ALL

## 2025-06-02 NOTE — PROGRESS NOTES
PHYSICAL THERAPY EVALUATION  Type of Visit: Evaluation       Fall Risk Screen:  Have you fallen 2 or more times in the past year?: No  Have you fallen and had an injury in the past year?: No  Is patient receiving Physical Therapy Services?: No    Subjective         Presenting condition or subjective complaint: Left hip pain  Date of onset: 03/01/25    Relevant medical history:     Dates & types of surgery: See chart    Prior diagnostic imaging/testing results:       Prior therapy history for the same diagnosis, illness or injury: No      Prior Level of Function  Transfers:   Ambulation:   ADL:   IADL:     Living Environment  Social support: With family members   Type of home: House; 2-story; Basement   Stairs to enter the home: Yes 2 Is there a railing: Yes     Ramp: No   Stairs inside the home: Yes 10 Is there a railing: Yes     Help at home: None  Equipment owned:       Employment: Yes Physician assistant  Hobbies/Interests: Biking, running, rock climbing    Patient goals for therapy: Run longer than 2 miles, more than once a week    Physical Therapist Assessment    KEY PT FINDINGS:  1) Negative hip intra-articular screen  2) TFL tenderness on LLE  3) Posterior chain strength deficits    Signs and symptoms are consistent with tensor fascia latae strain.      Subjective History    Patient was referred by Lindsay Alston for Hip pain, left [M25.552]   Referring provider plan: Pelvic pain in female  Pelvic floor Physical Therapy referral updated  - Physical Therapy  Referral; Future     Hip pain, left  Physical Therapy referral updated  - Physical Therapy  Referral; Future    PT Subjective:   Patient is a 34 year old female presenting to outpatient physical therapy with left hip pain and pelvic pain. Has upcoming pelvic health PT evaluation coming up later in June. First started noticing this left sided hip pain back in March when she started running for this season. She has been a cyclist  her whole life and wonders if that would also be impacted/limited. Her left hip has always felt tighter than her right hip, more noticeable with running getting up to a 7/10 pain recently. She did PT years ago where she was working knee pathology with cycling. Was running 2-3x/week for up to 3 miles at a time, noting that the pain is noticeable during the run but subsides when she stops. Encountering a hill would make her symptoms worse. Haven't been on a bike this season yet. Pain is located anterior hip, denies any clicking, catching or locking. Denies any radiating pain.  Pain Level at Rest: 0/10  Pain Level with Use: 7/10  Pain Location: hip  Pain Quality: Aching and tight  Pain Frequency: intermittent  Pain is Worst: dependent on activity  Pain is Exacerbated By: running, hills  Pain is Relieved By: rest and stretch  Pain Progression: Unchanged     PMH:   Patient Active Problem List   Diagnosis    Dupuytren's contracture of left hand    Intractable migraine with aura without status migrainosus     Medications:   Current Outpatient Medications   Medication Sig Dispense Refill    levonorgestrel (MIRENA) 52 MG (20 mcg/day) IUD 1 each by Intrauterine route once.      magnesium oxide 400 MG CAPS Take 1 capsule by mouth daily      phentermine 30 MG capsule Take 1 capsule (30 mg) by mouth every morning. 90 capsule 1    valACYclovir (VALTREX) 500 MG tablet Take 1 tablet (500 mg) by mouth daily. 90 tablet 3    Vitamin D (Cholecalciferol) 25 MCG (1000 UT) CAPS Take 1,000 Int'l Units by mouth       No current facility-administered medications for this visit.           Imaging: Pelvic Ultrasounds  Red Flag Screening: negative  Prior Treatment Includes: PT before  Lifestyle History:  Occupation: PA-C  Experience with physical activity: Cyclist, Running, Rock Climbing, Yard work  General Health Assessment: NT.  Referral recommended? No  Additional Considerations: Young kids at home    Patient Goals for Physical Therapy: Get  back to running, not wanting things to get worse.          Objective   Objective Examination    Static Posture/Observation  No obvious findings    Gait: Uncompensated Trandelenburg mild on L    Dynamic Movement Screen  Single leg stance :   RIGHT: No significant findings for eyes open/closed  LEFT:  No significant findings for eyes open/closed  Double limb squat:   Increased trunk lean  Single limb squat:    RIGHT: Good technique/no significant findings  LEFT: mild instability on L with hip IR    Lumbar Spine Screen:   ROM: NT  Repeated Movements:  NT    SIJ Screen:   Compression/Approximation: NT  Gapping/Distraction: NT  Sacral Thrust: NT  Thigh Thrust: NT  OTHER:    HIP Range of Motion: (* indicates patient's primary complaint)   AROM R PROM R AROM L PROM L MMT R MMT L   Flexion  WNL  WNL 5/5 5/5   Extension     3+/5 3+/5   ER  WNL  WNL     IR  WNL  WNL     Prone IR     X X   Abduction     4+/5 4/5*   Adduction         - Supine X X X X     - Hooklying X X X X     - 90/90 X X X X       KNEE AROM R PROM R AROM L PROM L MMT R MMT L   Extension     5/5 5/5   Flexion     5/5 5/5       Special tests: blank if not tested; * indicates patient's primary complaint   Right Left    Muscle Testing   Felix's     Amrit - + for RF limit   Ely's - +    Hamstring 90/90     Piriformis      Intra-Articular Testing   FADIR - -   Scour - -   Ligament. Teres     Log Roll - -    Neural Tension   SLR - -   Slump     Femoral Nerve      Other   SABIHA     Fulcrum     Negative SL Heel Drop Bilateral    Palpation  Tender to palpation at:   -RIGHT: WNL  -LEFT:  TFL muscle belly, anterior hip joint capusle, inguinal ligament        Assessment & Plan   CLINICAL IMPRESSIONS  Medical Diagnosis: Hip pain, left (M25.552)    Treatment Diagnosis: L TFL Strain   Impression/Assessment: Patient is a 34 year old female with left hip complaints.  The following significant findings have been identified: Pain, Decreased ROM/flexibility, Decreased strength,  Impaired balance, Decreased proprioception, Impaired gait, Impaired muscle performance, and Decreased activity tolerance. These impairments interfere with their ability to perform recreational activities as compared to previous level of function.     Clinical Decision Making (Complexity):  Clinical Presentation: Stable/Uncomplicated  Clinical Presentation Rationale: based on medical and personal factors listed in PT evaluation  Clinical Decision Making (Complexity): Low complexity    PLAN OF CARE  Treatment Interventions:  Modalities: Cryotherapy, Cupping, Dry Needling, E-stim, Hot Pack  Interventions: Manual Therapy, Neuromuscular Re-education, Therapeutic Activity, Therapeutic Exercise, Self-Care/Home Management    Long Term Goals     PT Goal 1  Goal Identifier: LTG1  Goal Description: Patient will be able to run for 3 miles with 0/10 left hip pain  Rationale: to maximize safety and independence with performance of ADLs and functional tasks  Target Date: 07/14/25  PT Goal 2  Goal Identifier: LTG2  Goal Description: Patient will report an improvement of at least 10 on the HOS to demonstrate MCID  Rationale: to maximize safety and independence with performance of ADLs and functional tasks;to maximize safety and independence within the home;to maximize safety and independence within the community;to maximize safety and independence with transportation;to maximize safety and independence with self cares  Target Date: 07/14/25      Frequency of Treatment: 1x/wk  Duration of Treatment: 6 weeks    Recommended Referrals to Other Professionals:   Education Assessment:   Learner/Method: Patient    Risks and benefits of evaluation/treatment have been explained.   Patient/Family/caregiver agrees with Plan of Care.     Evaluation Time:     PT Eval, Low Complexity Minutes (88644): 20       Signing Clinician: Feng Abdi PT

## 2025-06-10 ENCOUNTER — THERAPY VISIT (OUTPATIENT)
Dept: PHYSICAL THERAPY | Facility: CLINIC | Age: 35
End: 2025-06-10
Attending: PHYSICIAN ASSISTANT
Payer: COMMERCIAL

## 2025-06-10 DIAGNOSIS — M25.552 HIP PAIN, LEFT: Primary | ICD-10-CM

## 2025-06-10 PROCEDURE — 97140 MANUAL THERAPY 1/> REGIONS: CPT | Mod: GP

## 2025-06-10 PROCEDURE — 97110 THERAPEUTIC EXERCISES: CPT | Mod: GP

## 2025-06-10 PROCEDURE — 97530 THERAPEUTIC ACTIVITIES: CPT | Mod: GP

## 2025-06-23 ENCOUNTER — THERAPY VISIT (OUTPATIENT)
Dept: PHYSICAL THERAPY | Facility: CLINIC | Age: 35
End: 2025-06-23
Attending: PHYSICIAN ASSISTANT
Payer: COMMERCIAL

## 2025-06-23 DIAGNOSIS — R10.2 PELVIC PAIN IN FEMALE: ICD-10-CM

## 2025-06-23 PROCEDURE — 97530 THERAPEUTIC ACTIVITIES: CPT | Mod: GP

## 2025-06-23 PROCEDURE — 97161 PT EVAL LOW COMPLEX 20 MIN: CPT | Mod: GP

## 2025-06-23 PROCEDURE — 97110 THERAPEUTIC EXERCISES: CPT | Mod: GP

## 2025-06-23 PROCEDURE — 97140 MANUAL THERAPY 1/> REGIONS: CPT | Mod: GP

## 2025-06-23 NOTE — PROGRESS NOTES
PHYSICAL THERAPY EVALUATION  Type of Visit: Evaluation       Fall Risk Screen:  Have you fallen 2 or more times in the past year?: No  Have you fallen and had an injury in the past year?: No  Is patient receiving Physical Therapy Services?: No    Subjective         Presenting condition or subjective complaint: Left hip pain    Patient presents to physical therapy for evaluation. She reports that symptoms since the delivery of her child. Reports birth in Oct 2023. Reports symptoms have been staying pretty consistent- unchanged. Reports return to intercourse a little before 6 weeks after delivery. Had IUD placed in Nov - was perforated in July 2024, hysteroscopy on July 30. Reports that bleeding got better after hysteroscopy but pain did not get better. Reports that placement of IUD in Nov 2024. Has had pelvic US - no abnormal findings. Reports alternating positions is sometimes successful with symptom management - but sometimes certain positions will be fine one time and uncomfortable other times.     Date of onset: 05/09/25 (referral date)    Relevant medical history:     Past Medical History:   Diagnosis Date    Dupuytren contracture     Herpes simplex virus (HSV) infection 1992    Cold sores    Migraine     Migraines 3/2016    Migraines with aura      Dates & types of surgery: See chart  Past Surgical History:   Procedure Laterality Date    FINGER SURGERY Left     Fasciectomy x 2    INSERT INTRAUTERINE DEVICE  11/21/2023    Mirena IUD    LABIALPLASTY      OPERATIVE HYSTEROSCOPY N/A 07/30/2024    Procedure: HYSTEROSCOPIC INTRAUTERINE DEVICE REMOVAL WITH ULTRASOUND GUIDANCE,;  Surgeon: Marta Sanchez DO;  Location:  OR    ORTHOPEDIC SURGERY      VULVA SURGERY      wisdom teeth        Prior diagnostic imaging/testing results:       Prior therapy history for the same diagnosis, illness or injury: No      Prior Level of Function  Transfers: Independent  Ambulation: Independent  ADL: Independent  IADL:      Living Environment  Social support: With family members   Type of home: House; 2-story; Basement   Stairs to enter the home: Yes 2 Is there a railing: Yes     Ramp: No   Stairs inside the home: Yes 10 Is there a railing: Yes     Help at home: None  Equipment owned:       Employment: Yes Physician assistant  Hobbies/Interests: Biking, running, rock climbing    Patient goals for therapy: Run longer than 2 miles, more than once a week    Pain assessment: Pain denied     Objective      PELVIC EVALUATION  ADDITIONAL HISTORY:  Sex assigned at birth: Female  Gender identity: Female    Pronouns: She/Her Hers      Bladder History:  Reports no dysuria, reports complete emptying of bladder, no urinary urgency, frequency.  Feels bladder filling: Yes  Triggers for feeling of inability to wait to go to the bathroom: No    How long can you wait to urinate: 1-2 hours  Gets up at night to urinate: No    Can stop the flow of urine when urinating: Sometimes  Volume of urine usually released: Medium   Other issues:    Number of bladder infections in last 12 months:   none reported by patient.  Fluid intake per day: 48 water   1/2 cup coffee   Medications taken for bladder: No     Activities causing urine leak: Jump   a few times lifting  Amount of urine typically leaked: Dribbles  Pads used to help with leaking:          Bowel History:  No complaints with bowel function - reports complete emptying, no pain with bowel movements, no straining.  Frequency of bowel movement: Every day to every other day  Consistency of stool: Soft-formed   type 3-4  Ignores the urge to defecate: No  Other bowel issues:     Length of time spent trying to have a bowel movement:      Sexual Function History:  Sexual orientation: Straight    Sexually active: Yes  Lubrication used: Yes Yes  Pelvic pain: Initial penetration (rectal or vaginal); Deep penetration (rectal or vaginal)   cramping, pressure, sharp  Pain or difficulty with  orgasms/erection/ejaculation: No    State of menopause: Perimenopause (have not gone through menopause yet)  Hormone medications: No      Are you currently pregnant: No  Number of previous pregnancies: 1  Number of deliveries: 1  If you have delivered before, did you have any of these issues during delivery: Tearing; Vaginal delivery  Have you been diagnosed with pelvic prolapse or abdominal separation: No  Do you get regular exercise: Yes  I do this type of exercise: Jogging, walking, construction/lifting/carrying  Have you tried pelvic floor strengthening exercises for 4 weeks: No  Do you have any history of trauma that is relevant to your care that you d like to share: No    Discussed reason for referral regarding pelvic health needs and external/internal pelvic floor muscle examination with patient/guardian.  Opportunity provided to ask questions and verbal consent for assessment and intervention was given.    PAIN: Pain Level at Rest: 0/10  Pain Level with Use: 8/10  Pain Location: deep pelvic pain  Pain Quality: Aching, Pressure, and Sharp  Pain Frequency: intermittent  Pain is Worst: associated with sexual activity  Pain is Exacerbated By: sexual intercourse, supine / sidelying inconsistently, quadruped  Pain is Relieved By: rest  Pain Progression: Unchanged    POSTURE: Standing Posture: WNL  Sitting Posture: Thoracic kyphosis increased  LUMBAR SCREEN: AROM WNL  Pain free in all motions; flexion hands to toes.  HIP SCREEN:  Strength: WNL   Functional Strength Testing: Double Leg Squat: Knee valgus, Hip internal rotation, and Able to perform full deep squat without pain    PELVIC/SI SCREEN:     PAIN PROVOCATION TEST:   PELVIS/SI SPECIAL TESTS:    Left Right Additional Notes   ASLR      Gaenslen's Test      Pelvic Compression negative negative    Pelvic Gapping negative negative    Sacral Thrust negative negative    Thigh Thrust        BREATHING SYMMETRY:     PELVIC EXAM  External Visual Inspection:  At rest:  Elevated perineal body  With voluntary pelvic floor contraction: Perineal elevation, Present  Relaxation of PFM: Yes, Partial/delayed relaxation  With intra-abdominal pressure: Bearing down as defecation: Perineal descent, No change    Integumentary:   Introitus: Tight, Atrophy    External Digital Palpation per Perineum:   Ischiocavernosis: Unremarkable  Bulbo cavernosis: Unremarkable  Transverse perineal: Tightness  Levator ani: Tightness  Perineal body: Tightness  Public symphysis: Unremarkable    Scar:   Location/Type: consistent with grade 1-2 perineal tear with delivery, pt reports history of clitoral mas resection and labial surgery  Mobility: Hypomobile    Internal Digital Palpation:  Per Vagina:  Tenderness  Myofascial Resistance to Palpation: Taut  Digital Muscle Performance: P (Power): 4/5  E (Endurance): 10 seconds  R (Repetitions): 3  F (Fast Twitch): 10 performed  Compensations: Breath holding  Relaxation Post-Contraction: Partial/delayed relaxation    Per Rectum:        Pelvic Organ Prolapse:       ABDOMINAL ASSESSMENT  Diastasis Rectus Abdominis (SAMUEL):  SAMUEL presence: No  Location   Above Umbilicus Depth/Finger width: 1   At Umbilicus Depth/Finger width: 1   Below Umbilicus Depth/Finger width: 1  Patient does not demonstrate increase in width of linea alba when lifting head and neck.    Abdominal Activation/Strength:     Scar:   Location/Type:   Mobility:     Fascial Tension/Restriction:     BIOFEEDBACK:  Position:   Surface Electrodes:     Abdominals:     Perianals:       DERMATOMES:   DTR S:     Assessment & Plan   CLINICAL IMPRESSIONS  Medical Diagnosis: Pelvic pain in female    Treatment Diagnosis: perineal scar tissue hypomobility, increased pelvic floor muscle tension, dyspareunia   Impression/Assessment: Patient is a 34 year old female with dyspareunia complaints.  The following significant findings have been identified: Pain, Decreased ROM/flexibility, Decreased proprioception, Impaired muscle  performance, and Decreased activity tolerance. These impairments interfere with their ability to perform self care tasks, recreational activities, and household mobility as compared to previous level of function.     Clinical Decision Making (Complexity):  Clinical Presentation: Stable/Uncomplicated  Clinical Presentation Rationale: based on medical and personal factors listed in PT evaluation  Clinical Decision Making (Complexity): Low complexity    PLAN OF CARE  Treatment Interventions:  Modalities: Biofeedback, Ultrasound  Interventions: Manual Therapy, Neuromuscular Re-education, Therapeutic Activity, Therapeutic Exercise, Self-Care/Home Management    Long Term Goals     PT Goal 1  Goal Identifier: LTG1  Goal Description: Patient will report pain no greater than 2/10 with palpation to superficial pelvic floor tissue to demonstrate improved tissue mobility.  Rationale: to maximize safety and independence with performance of ADLs and functional tasks;to maximize safety and independence within the home;to maximize safety and independence with self cares  Target Date: 09/20/25      Frequency of Treatment: 1x per week for 3 weeks, 1x every other week for 9 weeks  Duration of Treatment: 12 weeks    Recommended Referrals to Other Professionals:   Education Assessment:   Learner/Method: Patient    Risks and benefits of evaluation/treatment have been explained.   Patient/Family/caregiver agrees with Plan of Care.     Evaluation Time:     PT Eval, Low Complexity Minutes (58435): 20       Signing Clinician: Lucretia Parsons PT

## 2025-07-28 ENCOUNTER — THERAPY VISIT (OUTPATIENT)
Dept: PHYSICAL THERAPY | Facility: CLINIC | Age: 35
End: 2025-07-28
Payer: COMMERCIAL

## 2025-07-28 DIAGNOSIS — R10.2 PELVIC PAIN IN FEMALE: Primary | ICD-10-CM

## 2025-07-28 PROCEDURE — 97110 THERAPEUTIC EXERCISES: CPT | Mod: GP

## 2025-07-28 PROCEDURE — 97140 MANUAL THERAPY 1/> REGIONS: CPT | Mod: GP

## 2025-07-30 ENCOUNTER — HOSPITAL ENCOUNTER (EMERGENCY)
Facility: CLINIC | Age: 35
Discharge: HOME | End: 2025-07-30
Attending: EMERGENCY MEDICINE | Admitting: EMERGENCY MEDICINE
Payer: COMMERCIAL

## 2025-07-30 VITALS
BODY MASS INDEX: 25.48 KG/M2 | DIASTOLIC BLOOD PRESSURE: 72 MMHG | HEART RATE: 69 BPM | OXYGEN SATURATION: 100 % | WEIGHT: 172 LBS | SYSTOLIC BLOOD PRESSURE: 120 MMHG | HEIGHT: 69 IN | TEMPERATURE: 97.6 F | RESPIRATION RATE: 17 BRPM

## 2025-07-30 DIAGNOSIS — S61.210A LACERATION OF RIGHT INDEX FINGER WITHOUT FOREIGN BODY WITHOUT DAMAGE TO NAIL, INITIAL ENCOUNTER: Primary | ICD-10-CM

## 2025-07-30 PROCEDURE — 99282 EMERGENCY DEPT VISIT SF MDM: CPT | Mod: 25 | Performed by: EMERGENCY MEDICINE

## 2025-07-30 PROCEDURE — 12001 RPR S/N/AX/GEN/TRNK 2.5CM/<: CPT | Mod: F6

## 2025-07-30 ASSESSMENT — ACTIVITIES OF DAILY LIVING (ADL)
ADLS_ACUITY_SCORE: 46

## 2025-07-31 NOTE — ED TRIAGE NOTES
Laceration to the middle finger on the right side on a piece of glass tupperware. Feels she needs stitches. Bleeding controlled at this time. Last tetanus was 2023.

## 2025-07-31 NOTE — ED PROVIDER NOTES
"  Emergency Department Note      History of Present Illness     Chief Complaint   Laceration      HPI   Meredith Dean is a 34 year old female presents the emergency department a laceration to the tip of her right pointer finger.  She was working in her kitchen and cut it on a piece of Tupperware.  She uses her hands a lot for work and is concerned she may need stitches.  Tdap up-to-date    Independent Historian   None    Review of External Notes       Past Medical History     Medical History and Problem List   Past Medical History:   Diagnosis Date    Dupuytren contracture     Herpes simplex virus (HSV) infection 1992    Migraine     Migraines 3/2016       Medications   levonorgestrel (MIRENA) 52 MG (20 mcg/day) IUD  magnesium oxide 400 MG CAPS  phentermine 30 MG capsule  valACYclovir (VALTREX) 500 MG tablet  Vitamin D (Cholecalciferol) 25 MCG (1000 UT) CAPS        Surgical History   Past Surgical History:   Procedure Laterality Date    FINGER SURGERY Left     Fasciectomy x 2    INSERT INTRAUTERINE DEVICE  11/21/2023    Mirena IUD    LABIALPLASTY      OPERATIVE HYSTEROSCOPY N/A 07/30/2024    Procedure: HYSTEROSCOPIC INTRAUTERINE DEVICE REMOVAL WITH ULTRASOUND GUIDANCE,;  Surgeon: Marta Sanchez DO;  Location:  OR    ORTHOPEDIC SURGERY      VULVA SURGERY      wisdom teeth         Physical Exam     Patient Vitals for the past 24 hrs:   BP Temp Temp src Pulse Resp SpO2 Height Weight   07/30/25 2102 120/72 97.6  F (36.4  C) Temporal 69 17 100 % 1.753 m (5' 9\") 78 kg (172 lb)     Physical Exam    Gen: Resting comfortably   Eyes: Clear conjunctiva, no discharge  Ears: External ears normal without swelling or drainage  Mouth: Mucous membranes moist  CV: regular rate  Resp: speaking in full sentences without any resp distress  Skin: 1 cm laceration to the pad of the right pointer finger  Neuro: Alert, no gross motor or sensory deficits,   Psych: pleasant, affect appropriate      Diagnostics     Lab Results   Labs " Ordered and Resulted from Time of ED Arrival to Time of ED Departure - No data to display    Imaging   No orders to display       EKG       Independent Interpretation   None    ED Course      Medications Administered   Medications - No data to display    Procedures   Procedures     Laceration Repair      Procedure: Laceration Repair    Indication: Laceration    Consent: Verbal    Tetanus status reviewed    Location: Right R second (index) finger    Length: 1 cm    Preparation: Irrigation with Sterile Saline.    Anesthesia/Sedation: Lidocaine - 1%      Treatment/Exploration: Wound explored, no foreign bodies found     Closure: The wound was closed with one layer. Skin/superficial layer was closed with 2 x 5-0 Nylon using Interrupted sutures.     Patient Status: The patient tolerated the procedure well: Yes. There were no complications.    Discussion of Management   None    ED Course        Additional Documentation  None    Medical Decision Making / Diagnosis     CMS Diagnoses: None    MIPS   None               YOLANDA KHOURY Dianne is a 34 year old female presents with a laceration to the tip of her right finger, not involving the nail.  She would prefer suture repair versus Steri-Strips after discussing.  Tdap up-to-date.  Wound cleansed and repaired in the ED by myself.  Recommended 8 to 10-day suture removal.  Return for increased pain redness or swelling    Disposition   The patient was discharged.     Diagnosis     ICD-10-CM    1. Laceration of right index finger without foreign body without damage to nail, initial encounter  S61.210A            Discharge Medications   New Prescriptions    No medications on file         MD Terry Owusu Paul Anthony, MD  07/30/25 [id number]       Basilio Stewart MD  07/30/25 [id number]

## 2025-08-04 ENCOUNTER — THERAPY VISIT (OUTPATIENT)
Dept: PHYSICAL THERAPY | Facility: CLINIC | Age: 35
End: 2025-08-04
Payer: COMMERCIAL

## 2025-08-04 DIAGNOSIS — R10.2 PELVIC PAIN IN FEMALE: Primary | ICD-10-CM

## 2025-08-04 PROCEDURE — 97110 THERAPEUTIC EXERCISES: CPT | Mod: GP

## 2025-08-04 PROCEDURE — 97530 THERAPEUTIC ACTIVITIES: CPT | Mod: GP

## (undated) DEVICE — SU MONOCRYL 4-0 PS-2 18" UND Y496G

## (undated) DEVICE — Device

## (undated) DEVICE — GLOVE BIOGEL PI MICRO INDICATOR UNDERGLOVE SZ 7.0 48970

## (undated) DEVICE — SOL WATER IRRIG 1000ML BOTTLE 2F7114

## (undated) DEVICE — PREP DURAPREP 26ML APL 8630

## (undated) DEVICE — SOL NACL 0.9% IRRIG 1000ML BOTTLE 2F7124

## (undated) DEVICE — CATH TRAY FOLEY SURESTEP 16FR WDRAIN BAG STLK LATEX A300316A

## (undated) DEVICE — SEAL SET MYOSURE ROD LENS SCOPE SINGLE USE 40-902

## (undated) DEVICE — SOL NACL 0.9% INJ 1000ML BAG 2B1324X

## (undated) DEVICE — ENDO SCOPE WARMER LF TM500

## (undated) DEVICE — NDL 25GA 1.5" 305127

## (undated) DEVICE — WIPES FOLEY CARE SURESTEP PROVON DFC100

## (undated) DEVICE — ANTIFOG SOLUTION SEE SHARP 150M TROCAR SWABS 30978

## (undated) DEVICE — NDL SPINAL 22GA 3.5" QUINCKE 405181

## (undated) DEVICE — DECANTER BAG 2002S

## (undated) DEVICE — ENDO TROCAR SLEEVE KII ADV FIXATION 05X100MM CFS02

## (undated) DEVICE — GLOVE BIOGEL PI ULTRATOUCH SZ 6.5 41165

## (undated) DEVICE — CATH INTERMITTENT CLEAN-CATH FEMALE 14FR 6" VINYL LF 420614

## (undated) DEVICE — KIT PROCEDURE FLUENT IN/OUT FLOWPAK TISS TRAP FLT-112S

## (undated) DEVICE — SOL NACL 0.9% IRRIG 3000ML BAG 2B7477

## (undated) DEVICE — GLOVE BIOGEL PI ULTRATOUCH SZ 7.0 41170

## (undated) DEVICE — LINEN TOWEL PACK X5 5464

## (undated) DEVICE — PACK TVT HYSTEROSCOPY SMA15HYFSE

## (undated) DEVICE — ENDO TROCAR FIRST ENTRY KII FIOS ADV FIX 05X100MM CFF03

## (undated) DEVICE — ESU HOLDER LAP INST DISP PURPLE LONG 330MM H-PRO-330

## (undated) DEVICE — SYR 10ML FINGER CONTROL W/O NDL 309695

## (undated) RX ORDER — ACETAMINOPHEN 325 MG/1
TABLET ORAL
Status: DISPENSED
Start: 2024-07-30

## (undated) RX ORDER — FENTANYL CITRATE 50 UG/ML
INJECTION, SOLUTION INTRAMUSCULAR; INTRAVENOUS
Status: DISPENSED
Start: 2024-07-30

## (undated) RX ORDER — KETOROLAC TROMETHAMINE 15 MG/ML
INJECTION, SOLUTION INTRAMUSCULAR; INTRAVENOUS
Status: DISPENSED
Start: 2024-07-30